# Patient Record
Sex: FEMALE | Race: OTHER | NOT HISPANIC OR LATINO | URBAN - METROPOLITAN AREA
[De-identification: names, ages, dates, MRNs, and addresses within clinical notes are randomized per-mention and may not be internally consistent; named-entity substitution may affect disease eponyms.]

---

## 2023-11-07 VITALS
RESPIRATION RATE: 17 BRPM | OXYGEN SATURATION: 98 % | SYSTOLIC BLOOD PRESSURE: 115 MMHG | TEMPERATURE: 98 F | WEIGHT: 130.07 LBS | DIASTOLIC BLOOD PRESSURE: 76 MMHG | HEART RATE: 90 BPM | HEIGHT: 67 IN

## 2023-11-07 LAB
ALBUMIN SERPL ELPH-MCNC: 4.8 G/DL — SIGNIFICANT CHANGE UP (ref 3.3–5)
ALBUMIN SERPL ELPH-MCNC: 4.8 G/DL — SIGNIFICANT CHANGE UP (ref 3.3–5)
ALP SERPL-CCNC: 68 U/L — SIGNIFICANT CHANGE UP (ref 40–120)
ALP SERPL-CCNC: 68 U/L — SIGNIFICANT CHANGE UP (ref 40–120)
ALT FLD-CCNC: 23 U/L — SIGNIFICANT CHANGE UP (ref 10–45)
ALT FLD-CCNC: 23 U/L — SIGNIFICANT CHANGE UP (ref 10–45)
ANION GAP SERPL CALC-SCNC: 11 MMOL/L — SIGNIFICANT CHANGE UP (ref 5–17)
ANION GAP SERPL CALC-SCNC: 11 MMOL/L — SIGNIFICANT CHANGE UP (ref 5–17)
AST SERPL-CCNC: 20 U/L — SIGNIFICANT CHANGE UP (ref 10–40)
AST SERPL-CCNC: 20 U/L — SIGNIFICANT CHANGE UP (ref 10–40)
BASOPHILS # BLD AUTO: 0.1 K/UL — SIGNIFICANT CHANGE UP (ref 0–0.2)
BASOPHILS # BLD AUTO: 0.1 K/UL — SIGNIFICANT CHANGE UP (ref 0–0.2)
BASOPHILS NFR BLD AUTO: 1.1 % — SIGNIFICANT CHANGE UP (ref 0–2)
BASOPHILS NFR BLD AUTO: 1.1 % — SIGNIFICANT CHANGE UP (ref 0–2)
BILIRUB SERPL-MCNC: 0.2 MG/DL — SIGNIFICANT CHANGE UP (ref 0.2–1.2)
BILIRUB SERPL-MCNC: 0.2 MG/DL — SIGNIFICANT CHANGE UP (ref 0.2–1.2)
BUN SERPL-MCNC: 11 MG/DL — SIGNIFICANT CHANGE UP (ref 7–23)
BUN SERPL-MCNC: 11 MG/DL — SIGNIFICANT CHANGE UP (ref 7–23)
CALCIUM SERPL-MCNC: 9.4 MG/DL — SIGNIFICANT CHANGE UP (ref 8.4–10.5)
CALCIUM SERPL-MCNC: 9.4 MG/DL — SIGNIFICANT CHANGE UP (ref 8.4–10.5)
CHLORIDE SERPL-SCNC: 104 MMOL/L — SIGNIFICANT CHANGE UP (ref 96–108)
CHLORIDE SERPL-SCNC: 104 MMOL/L — SIGNIFICANT CHANGE UP (ref 96–108)
CO2 SERPL-SCNC: 23 MMOL/L — SIGNIFICANT CHANGE UP (ref 22–31)
CO2 SERPL-SCNC: 23 MMOL/L — SIGNIFICANT CHANGE UP (ref 22–31)
CREAT SERPL-MCNC: 0.73 MG/DL — SIGNIFICANT CHANGE UP (ref 0.5–1.3)
CREAT SERPL-MCNC: 0.73 MG/DL — SIGNIFICANT CHANGE UP (ref 0.5–1.3)
EGFR: 117 ML/MIN/1.73M2 — SIGNIFICANT CHANGE UP
EGFR: 117 ML/MIN/1.73M2 — SIGNIFICANT CHANGE UP
EOSINOPHIL # BLD AUTO: 0.35 K/UL — SIGNIFICANT CHANGE UP (ref 0–0.5)
EOSINOPHIL # BLD AUTO: 0.35 K/UL — SIGNIFICANT CHANGE UP (ref 0–0.5)
EOSINOPHIL NFR BLD AUTO: 4 % — SIGNIFICANT CHANGE UP (ref 0–6)
EOSINOPHIL NFR BLD AUTO: 4 % — SIGNIFICANT CHANGE UP (ref 0–6)
GLUCOSE SERPL-MCNC: 126 MG/DL — HIGH (ref 70–99)
GLUCOSE SERPL-MCNC: 126 MG/DL — HIGH (ref 70–99)
HCT VFR BLD CALC: 42.2 % — SIGNIFICANT CHANGE UP (ref 34.5–45)
HCT VFR BLD CALC: 42.2 % — SIGNIFICANT CHANGE UP (ref 34.5–45)
HGB BLD-MCNC: 14.4 G/DL — SIGNIFICANT CHANGE UP (ref 11.5–15.5)
HGB BLD-MCNC: 14.4 G/DL — SIGNIFICANT CHANGE UP (ref 11.5–15.5)
IMM GRANULOCYTES NFR BLD AUTO: 0.2 % — SIGNIFICANT CHANGE UP (ref 0–0.9)
IMM GRANULOCYTES NFR BLD AUTO: 0.2 % — SIGNIFICANT CHANGE UP (ref 0–0.9)
LYMPHOCYTES # BLD AUTO: 2.27 K/UL — SIGNIFICANT CHANGE UP (ref 1–3.3)
LYMPHOCYTES # BLD AUTO: 2.27 K/UL — SIGNIFICANT CHANGE UP (ref 1–3.3)
LYMPHOCYTES # BLD AUTO: 25.7 % — SIGNIFICANT CHANGE UP (ref 13–44)
LYMPHOCYTES # BLD AUTO: 25.7 % — SIGNIFICANT CHANGE UP (ref 13–44)
MCHC RBC-ENTMCNC: 30.3 PG — SIGNIFICANT CHANGE UP (ref 27–34)
MCHC RBC-ENTMCNC: 30.3 PG — SIGNIFICANT CHANGE UP (ref 27–34)
MCHC RBC-ENTMCNC: 34.1 GM/DL — SIGNIFICANT CHANGE UP (ref 32–36)
MCHC RBC-ENTMCNC: 34.1 GM/DL — SIGNIFICANT CHANGE UP (ref 32–36)
MCV RBC AUTO: 88.7 FL — SIGNIFICANT CHANGE UP (ref 80–100)
MCV RBC AUTO: 88.7 FL — SIGNIFICANT CHANGE UP (ref 80–100)
MONOCYTES # BLD AUTO: 0.54 K/UL — SIGNIFICANT CHANGE UP (ref 0–0.9)
MONOCYTES # BLD AUTO: 0.54 K/UL — SIGNIFICANT CHANGE UP (ref 0–0.9)
MONOCYTES NFR BLD AUTO: 6.1 % — SIGNIFICANT CHANGE UP (ref 2–14)
MONOCYTES NFR BLD AUTO: 6.1 % — SIGNIFICANT CHANGE UP (ref 2–14)
NEUTROPHILS # BLD AUTO: 5.55 K/UL — SIGNIFICANT CHANGE UP (ref 1.8–7.4)
NEUTROPHILS # BLD AUTO: 5.55 K/UL — SIGNIFICANT CHANGE UP (ref 1.8–7.4)
NEUTROPHILS NFR BLD AUTO: 62.9 % — SIGNIFICANT CHANGE UP (ref 43–77)
NEUTROPHILS NFR BLD AUTO: 62.9 % — SIGNIFICANT CHANGE UP (ref 43–77)
NRBC # BLD: 0 /100 WBCS — SIGNIFICANT CHANGE UP (ref 0–0)
NRBC # BLD: 0 /100 WBCS — SIGNIFICANT CHANGE UP (ref 0–0)
PLATELET # BLD AUTO: 361 K/UL — SIGNIFICANT CHANGE UP (ref 150–400)
PLATELET # BLD AUTO: 361 K/UL — SIGNIFICANT CHANGE UP (ref 150–400)
POTASSIUM SERPL-MCNC: 4 MMOL/L — SIGNIFICANT CHANGE UP (ref 3.5–5.3)
POTASSIUM SERPL-MCNC: 4 MMOL/L — SIGNIFICANT CHANGE UP (ref 3.5–5.3)
POTASSIUM SERPL-SCNC: 4 MMOL/L — SIGNIFICANT CHANGE UP (ref 3.5–5.3)
POTASSIUM SERPL-SCNC: 4 MMOL/L — SIGNIFICANT CHANGE UP (ref 3.5–5.3)
PROT SERPL-MCNC: 7.6 G/DL — SIGNIFICANT CHANGE UP (ref 6–8.3)
PROT SERPL-MCNC: 7.6 G/DL — SIGNIFICANT CHANGE UP (ref 6–8.3)
RBC # BLD: 4.76 M/UL — SIGNIFICANT CHANGE UP (ref 3.8–5.2)
RBC # BLD: 4.76 M/UL — SIGNIFICANT CHANGE UP (ref 3.8–5.2)
RBC # FLD: 12.2 % — SIGNIFICANT CHANGE UP (ref 10.3–14.5)
RBC # FLD: 12.2 % — SIGNIFICANT CHANGE UP (ref 10.3–14.5)
SODIUM SERPL-SCNC: 138 MMOL/L — SIGNIFICANT CHANGE UP (ref 135–145)
SODIUM SERPL-SCNC: 138 MMOL/L — SIGNIFICANT CHANGE UP (ref 135–145)
WBC # BLD: 8.83 K/UL — SIGNIFICANT CHANGE UP (ref 3.8–10.5)
WBC # BLD: 8.83 K/UL — SIGNIFICANT CHANGE UP (ref 3.8–10.5)
WBC # FLD AUTO: 8.83 K/UL — SIGNIFICANT CHANGE UP (ref 3.8–10.5)
WBC # FLD AUTO: 8.83 K/UL — SIGNIFICANT CHANGE UP (ref 3.8–10.5)

## 2023-11-07 PROCEDURE — 93010 ELECTROCARDIOGRAM REPORT: CPT

## 2023-11-07 PROCEDURE — 99285 EMERGENCY DEPT VISIT HI MDM: CPT

## 2023-11-07 NOTE — ED ADULT TRIAGE NOTE - HISTORY OF COVID-19 VACCINATION
Care Due:                  Date            Visit Type   Department     Provider  --------------------------------------------------------------------------------                                [facility]                              ANNUAL       [facility]                              CHECKUP/PHY  MEDICINE/INTERN  Last Visit: 05-      S            AL JOHANA Swann                              [facility]                              ANNUAL       [facility]                              CHECKUP/PHY  MEDICINE/INTERN  Next Visit: 06-      S            CHRISTINE Swann                                                            Last  Test          Frequency    Reason                     Performed    Due Date  --------------------------------------------------------------------------------    CMP.........  12 months..  hydroCHLOROthiazide,       05- 05-                             simvastatin..............    Lipid Panel.  12 months..  simvastatin..............  05- 05-    [facility] Embedded Care Gaps. Reference number: [id number]. 5/19/2022   12:41:03 PM CDT   Yes

## 2023-11-07 NOTE — ED ADULT TRIAGE NOTE - CHIEF COMPLAINT QUOTE
Pt presents with c/o "suicidal ideation, hypomania, intrusive thoughts and hearing voices" x approx 3 months. Denies any plan, no previous attempt at suicide, has hx of "self harm". States "my thoughts are disorganized and I don't feel safe". Previous hospitalizations at outside facilities per pt. Hx of bipolar 1 and PTSD. Cooperative and compliant, maintains eye contact, affect WNL.

## 2023-11-07 NOTE — ED ADULT NURSE NOTE - OBJECTIVE STATEMENT
Received patient ambulatory accompanied by  with chief complaint of 'feeling of not being safe'. Said complaint started few days ago, however denies self-harm and hurting others. Denies signs and symptoms such as fever, vomiting, chest pain/discomfort, shortness of breath, diarrhea or body weakness.  On PE, AOX4, speaking full sentences without difficulty. Patient not in active cardiac or respiratory distress, no noted neurologic deficits.  No obvious trauma/injury/deformity noted. Patient oriented to ED area. All needs attended. POC reviewed. Patient placed on 1:1 observation. Purposeful proactive hourly rounding in progress.

## 2023-11-07 NOTE — ED ADULT NURSE NOTE - NSFALLUNIVINTERV_ED_ALL_ED
Bed/Stretcher in lowest position, wheels locked, appropriate side rails in place/Call bell, personal items and telephone in reach/Instruct patient to call for assistance before getting out of bed/chair/stretcher/Non-slip footwear applied when patient is off stretcher/Post to call system/Physically safe environment - no spills, clutter or unnecessary equipment/Purposeful proactive rounding/Room/bathroom lighting operational, light cord in reach

## 2023-11-07 NOTE — ED ADULT NURSE NOTE - IS THE PATIENT ABLE TO BE SCREENED?
DATE:  07/09/2020



FOLLOWUP PROGRESS NOTE



Case was discussed with staff of the patient, reviewed records.  The patient has

been taking medications and sometimes she refuses them.  She is internally

preoccupied.  Continues to be psychotic, continues to be unable to make safe

plan for self-care.  Continues to have poor insight.  I will be increasing her

dose of Haldol to 4 mg twice a day to improve her psychotic behavior as she

continues to be paranoid and delusional.  No side effects with the medication,

no sedation, no nausea and no extrapyramidal symptoms.  We will continue to work

with the patient in group therapy, milieu therapy, adjust the medication as

needed.





DD: 07/09/2020 10:43

DT: 07/10/2020 04:46

JOB# [id number]  [id number] Yes

## 2023-11-08 ENCOUNTER — INPATIENT (INPATIENT)
Facility: HOSPITAL | Age: 23
LOS: 8 days | Discharge: NURSING, REHAB OR LONG-TERM CARE | DRG: 881 | End: 2023-11-17
Attending: PSYCHIATRY & NEUROLOGY | Admitting: PSYCHIATRY & NEUROLOGY
Payer: COMMERCIAL

## 2023-11-08 DIAGNOSIS — F39 UNSPECIFIED MOOD [AFFECTIVE] DISORDER: ICD-10-CM

## 2023-11-08 LAB
AMPHET UR-MCNC: NEGATIVE — SIGNIFICANT CHANGE UP
AMPHET UR-MCNC: NEGATIVE — SIGNIFICANT CHANGE UP
APAP SERPL-MCNC: <5 UG/ML — LOW (ref 10–30)
APAP SERPL-MCNC: <5 UG/ML — LOW (ref 10–30)
APPEARANCE UR: CLEAR — SIGNIFICANT CHANGE UP
APPEARANCE UR: CLEAR — SIGNIFICANT CHANGE UP
BARBITURATES UR SCN-MCNC: NEGATIVE — SIGNIFICANT CHANGE UP
BARBITURATES UR SCN-MCNC: NEGATIVE — SIGNIFICANT CHANGE UP
BENZODIAZ UR-MCNC: NEGATIVE — SIGNIFICANT CHANGE UP
BENZODIAZ UR-MCNC: NEGATIVE — SIGNIFICANT CHANGE UP
BILIRUB UR-MCNC: NEGATIVE — SIGNIFICANT CHANGE UP
BILIRUB UR-MCNC: NEGATIVE — SIGNIFICANT CHANGE UP
COCAINE METAB.OTHER UR-MCNC: NEGATIVE — SIGNIFICANT CHANGE UP
COCAINE METAB.OTHER UR-MCNC: NEGATIVE — SIGNIFICANT CHANGE UP
COLOR SPEC: YELLOW — SIGNIFICANT CHANGE UP
COLOR SPEC: YELLOW — SIGNIFICANT CHANGE UP
COVID-19 SPIKE DOMAIN AB INTERP: POSITIVE
COVID-19 SPIKE DOMAIN AB INTERP: POSITIVE
COVID-19 SPIKE DOMAIN ANTIBODY RESULT: >250 U/ML — HIGH
COVID-19 SPIKE DOMAIN ANTIBODY RESULT: >250 U/ML — HIGH
DIFF PNL FLD: NEGATIVE — SIGNIFICANT CHANGE UP
DIFF PNL FLD: NEGATIVE — SIGNIFICANT CHANGE UP
ETHANOL SERPL-MCNC: <10 MG/DL — SIGNIFICANT CHANGE UP (ref 0–10)
ETHANOL SERPL-MCNC: <10 MG/DL — SIGNIFICANT CHANGE UP (ref 0–10)
GLUCOSE UR QL: NEGATIVE MG/DL — SIGNIFICANT CHANGE UP
GLUCOSE UR QL: NEGATIVE MG/DL — SIGNIFICANT CHANGE UP
HCG SERPL-ACNC: <0 MIU/ML — SIGNIFICANT CHANGE UP
HCG SERPL-ACNC: <0 MIU/ML — SIGNIFICANT CHANGE UP
KETONES UR-MCNC: ABNORMAL MG/DL
KETONES UR-MCNC: ABNORMAL MG/DL
LEUKOCYTE ESTERASE UR-ACNC: NEGATIVE — SIGNIFICANT CHANGE UP
LEUKOCYTE ESTERASE UR-ACNC: NEGATIVE — SIGNIFICANT CHANGE UP
METHADONE UR-MCNC: NEGATIVE — SIGNIFICANT CHANGE UP
METHADONE UR-MCNC: NEGATIVE — SIGNIFICANT CHANGE UP
NITRITE UR-MCNC: NEGATIVE — SIGNIFICANT CHANGE UP
NITRITE UR-MCNC: NEGATIVE — SIGNIFICANT CHANGE UP
OPIATES UR-MCNC: NEGATIVE — SIGNIFICANT CHANGE UP
OPIATES UR-MCNC: NEGATIVE — SIGNIFICANT CHANGE UP
PCP SPEC-MCNC: SIGNIFICANT CHANGE UP
PCP SPEC-MCNC: SIGNIFICANT CHANGE UP
PCP UR-MCNC: NEGATIVE — SIGNIFICANT CHANGE UP
PCP UR-MCNC: NEGATIVE — SIGNIFICANT CHANGE UP
PH UR: 6 — SIGNIFICANT CHANGE UP (ref 5–8)
PH UR: 6 — SIGNIFICANT CHANGE UP (ref 5–8)
PROT UR-MCNC: NEGATIVE MG/DL — SIGNIFICANT CHANGE UP
PROT UR-MCNC: NEGATIVE MG/DL — SIGNIFICANT CHANGE UP
SALICYLATES SERPL-MCNC: <0.3 MG/DL — LOW (ref 2.8–20)
SALICYLATES SERPL-MCNC: <0.3 MG/DL — LOW (ref 2.8–20)
SARS-COV-2 IGG+IGM SERPL QL IA: >250 U/ML — HIGH
SARS-COV-2 IGG+IGM SERPL QL IA: >250 U/ML — HIGH
SARS-COV-2 IGG+IGM SERPL QL IA: POSITIVE
SARS-COV-2 IGG+IGM SERPL QL IA: POSITIVE
SARS-COV-2 RNA SPEC QL NAA+PROBE: NEGATIVE — SIGNIFICANT CHANGE UP
SARS-COV-2 RNA SPEC QL NAA+PROBE: NEGATIVE — SIGNIFICANT CHANGE UP
SP GR SPEC: 1.01 — SIGNIFICANT CHANGE UP (ref 1–1.03)
SP GR SPEC: 1.01 — SIGNIFICANT CHANGE UP (ref 1–1.03)
THC UR QL: NEGATIVE — SIGNIFICANT CHANGE UP
THC UR QL: NEGATIVE — SIGNIFICANT CHANGE UP
TSH SERPL-MCNC: 0.85 UIU/ML — SIGNIFICANT CHANGE UP (ref 0.27–4.2)
TSH SERPL-MCNC: 0.85 UIU/ML — SIGNIFICANT CHANGE UP (ref 0.27–4.2)
UROBILINOGEN FLD QL: 0.2 MG/DL — SIGNIFICANT CHANGE UP (ref 0.2–1)
UROBILINOGEN FLD QL: 0.2 MG/DL — SIGNIFICANT CHANGE UP (ref 0.2–1)

## 2023-11-08 PROCEDURE — 90792 PSYCH DIAG EVAL W/MED SRVCS: CPT | Mod: 95

## 2023-11-08 RX ORDER — ONDANSETRON 8 MG/1
4 TABLET, FILM COATED ORAL ONCE
Refills: 0 | Status: COMPLETED | OUTPATIENT
Start: 2023-11-08 | End: 2023-11-08

## 2023-11-08 RX ORDER — ONDANSETRON 8 MG/1
8 TABLET, FILM COATED ORAL EVERY 8 HOURS
Refills: 0 | Status: DISCONTINUED | OUTPATIENT
Start: 2023-11-08 | End: 2023-11-17

## 2023-11-08 RX ORDER — LURASIDONE HYDROCHLORIDE 40 MG/1
60 TABLET ORAL AT BEDTIME
Refills: 0 | Status: DISCONTINUED | OUTPATIENT
Start: 2023-11-08 | End: 2023-11-17

## 2023-11-08 RX ORDER — ONDANSETRON 8 MG/1
4 TABLET, FILM COATED ORAL EVERY 8 HOURS
Refills: 0 | Status: DISCONTINUED | OUTPATIENT
Start: 2023-11-08 | End: 2023-11-17

## 2023-11-08 RX ORDER — QUETIAPINE FUMARATE 200 MG/1
50 TABLET, FILM COATED ORAL EVERY 6 HOURS
Refills: 0 | Status: DISCONTINUED | OUTPATIENT
Start: 2023-11-08 | End: 2023-11-17

## 2023-11-08 RX ORDER — INFLUENZA VIRUS VACCINE 15; 15; 15; 15 UG/.5ML; UG/.5ML; UG/.5ML; UG/.5ML
0.5 SUSPENSION INTRAMUSCULAR ONCE
Refills: 0 | Status: COMPLETED | OUTPATIENT
Start: 2023-11-08 | End: 2023-11-13

## 2023-11-08 RX ORDER — ACETAMINOPHEN 500 MG
975 TABLET ORAL ONCE
Refills: 0 | Status: COMPLETED | OUTPATIENT
Start: 2023-11-08 | End: 2023-11-08

## 2023-11-08 RX ORDER — ACETAMINOPHEN 500 MG
650 TABLET ORAL EVERY 6 HOURS
Refills: 0 | Status: DISCONTINUED | OUTPATIENT
Start: 2023-11-08 | End: 2023-11-17

## 2023-11-08 RX ORDER — HYDROXYZINE HCL 10 MG
25 TABLET ORAL EVERY 6 HOURS
Refills: 0 | Status: DISCONTINUED | OUTPATIENT
Start: 2023-11-08 | End: 2023-11-17

## 2023-11-08 RX ORDER — ONDANSETRON 8 MG/1
2 TABLET, FILM COATED ORAL ONCE
Refills: 0 | Status: DISCONTINUED | OUTPATIENT
Start: 2023-11-08 | End: 2023-11-08

## 2023-11-08 RX ADMIN — Medication 650 MILLIGRAM(S): at 14:05

## 2023-11-08 RX ADMIN — LURASIDONE HYDROCHLORIDE 60 MILLIGRAM(S): 40 TABLET ORAL at 23:23

## 2023-11-08 RX ADMIN — Medication 650 MILLIGRAM(S): at 13:05

## 2023-11-08 RX ADMIN — ONDANSETRON 4 MILLIGRAM(S): 8 TABLET, FILM COATED ORAL at 16:40

## 2023-11-08 RX ADMIN — Medication 650 MILLIGRAM(S): at 22:56

## 2023-11-08 RX ADMIN — Medication 975 MILLIGRAM(S): at 03:24

## 2023-11-08 RX ADMIN — Medication 650 MILLIGRAM(S): at 21:31

## 2023-11-08 RX ADMIN — LURASIDONE HYDROCHLORIDE 60 MILLIGRAM(S): 40 TABLET ORAL at 05:14

## 2023-11-08 RX ADMIN — ONDANSETRON 4 MILLIGRAM(S): 8 TABLET, FILM COATED ORAL at 22:56

## 2023-11-08 NOTE — ED BEHAVIORAL HEALTH ASSESSMENT NOTE - RISK ASSESSMENT
RF: previous admission, self harm, previous substance use, SI     PF: connected to outpatient care, denies substances, sobriety     RM: admit

## 2023-11-08 NOTE — ED PROVIDER NOTE - OBJECTIVE STATEMENT
23 yr old female, history of bipolar 1, 23 yr old female, history of bipolar, PTSD, no significant pmh, presents to the Emergency Department w abi.  psych hospitalization 6yrs ago in [place]. compliant w latuda. follows w outpt psych team regularly. denies drug / etoh use. sober x 4 yrs from previous etoh use.   pt here w worsening suicidal ideations, intrusive thoughts, limited self control x few months. worse in the last week. here concerned that she is nearing a manic episode and would like to get help. has engaged in self harm - only time three weeks ago (burning self) but having increasing urges to do so that she doesn't feel as though she can control. thoughts of SI w thought to jump in front of subway train. feels her thoughts are disorganized. hears voices when trying to go to bed at night. attributes this to worsening stress w grad school and issues w her partner.

## 2023-11-08 NOTE — ED BEHAVIORAL HEALTH ASSESSMENT NOTE - OTHER PAST PSYCHIATRIC HISTORY (INCLUDE DETAILS REGARDING ONSET, COURSE OF ILLNESS, INPATIENT/OUTPATIENT TREATMENT)
Previous Dx: Bipolar PTSD     Previous Med Trials: reports many previous trials unk names     Previous IP treatment: 4x, last 6 years ago in [place]     Previous SA: denies     Self harming: burning with cigarettes (3 weeks ago last)     Current MH treatment: psychiatrist but unk name ([facility]); Herminia is therpaist seen weekly     Violence/Legal: denies O-Z Flap Text: The defect edges were debeveled with a #15 scalpel blade.  Given the location of the defect, shape of the defect and the proximity to free margins an O-Z flap was deemed most appropriate.  Using a sterile surgical marker, an appropriate transposition flap was drawn incorporating the defect and placing the expected incisions within the relaxed skin tension lines where possible. The area thus outlined was incised deep to adipose tissue with a #15 scalpel blade.  The skin margins were undermined to an appropriate distance in all directions utilizing iris scissors.

## 2023-11-08 NOTE — BH SOCIAL WORK INITIAL PSYCHOSOCIAL EVALUATION - DETAILS
Per Chart: Reports mom with depression; uncle alcoholism, cousin with bipolar disorder 2, sister has patter of suicide attempts, eating disorders; sister with depression; brother with substance use hx.

## 2023-11-08 NOTE — ED BEHAVIORAL HEALTH ASSESSMENT NOTE - HPI (INCLUDE ILLNESS QUALITY, SEVERITY, DURATION, TIMING, CONTEXT, MODIFYING FACTORS, ASSOCIATED SIGNS AND SYMPTOMS)
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 To note patient has prev hospitalization (last in [place] about 6 years ago), no previous SA, previous self harm (last reported 3 weeks ago), no legal/violence hx,  substance use hx (reports alcohol sobriety for 4 years).      On interview, she reports having worsening SI and intrusive thoughts for the past 3 months and worsening in the past week. Reports that she is having worsening SI and has thought about jumping in front of subway train. She also reports urges to self harm via burning and urge to use substances (reports sobriety for 4 years). Reports additionally feeling thoughts are disorganized and VH intrusive thoughts of self harm and SI. Reports chronic AH when going to bed and denies any changes. Reports stressor of grad school this fall and difficulty with partner. 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 Reports compliance with Latuda 60mg qHS for past years (reports increased to current dose this year). Notes seeing psychiatrist and therapist. Reports hx of alonso last about 6 years ago when hospitalized with poor sleep and psychosis. Reports currently having difficulty with sleeping and only getting about 5 hours of sleep (denies full alonso sx). Wishes for admission. Denies HI.

## 2023-11-08 NOTE — ED BEHAVIORAL HEALTH ASSESSMENT NOTE - DETAILS
reports mom with depression; uncle alcoholism, cousin with bipolar disorder 2, sister has patter of suicide attempts, eating disorders; sister with depression; brother with substance use hx self see HPI via phone reports PTSD hx
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
## 2023-11-08 NOTE — BH INPATIENT PSYCHIATRY ASSESSMENT NOTE - NSBHCHARTREVIEWVS_PSY_A_CORE FT
Vital Signs Last 24 Hrs  T(C): 36.6 (11-08-23 @ 09:46), Max: 36.7 (11-08-23 @ 03:20)  T(F): 97.8 (11-08-23 @ 09:46), Max: 98 (11-08-23 @ 03:20)  HR: 68 (11-08-23 @ 09:46) (68 - 90)  BP: 107/69 (11-08-23 @ 09:46) (107/69 - 134/83)  BP(mean): --  RR: 16 (11-08-23 @ 09:46) (16 - 18)  SpO2: 99% (11-08-23 @ 09:46) (98% - 100%)     Vital Signs Last 24 Hrs  T(C): 37.1 (11-08-23 @ 16:42), Max: 37.1 (11-08-23 @ 16:42)  T(F): 98.8 (11-08-23 @ 16:42), Max: 98.8 (11-08-23 @ 16:42)  HR: 69 (11-08-23 @ 16:42) (68 - 90)  BP: 126/86 (11-08-23 @ 16:42) (107/69 - 134/83)  BP(mean): --  RR: 18 (11-08-23 @ 16:42) (16 - 18)  SpO2: 98% (11-08-23 @ 16:42) (98% - 100%)     Vital Signs Last 24 Hrs  T(C): 36.4 (11-16-23 @ 17:06), Max: 36.7 (11-16-23 @ 09:05)  T(F): 97.6 (11-16-23 @ 17:06), Max: 98.1 (11-16-23 @ 09:05)  HR: 73 (11-16-23 @ 17:06) (67 - 73)  BP: 119/80 (11-16-23 @ 17:06) (118/80 - 119/80)  BP(mean): --  RR: 18 (11-16-23 @ 17:06) (18 - 18)  SpO2: 97% (11-16-23 @ 17:06) (97% - 97%)

## 2023-11-08 NOTE — BH SOCIAL WORK INITIAL PSYCHOSOCIAL EVALUATION - NSBHEMPPOSITIONFT_PSY_ALL_CORE
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

## 2023-11-08 NOTE — BH INPATIENT PSYCHIATRY ASSESSMENT NOTE - OTHER PAST PSYCHIATRIC HISTORY (INCLUDE DETAILS REGARDING ONSET, COURSE OF ILLNESS, INPATIENT/OUTPATIENT TREATMENT)
Previous Dx: Bipolar PTSD     Previous Med Trials: reports many previous trials unk names     Previous IP treatment: 4x, last 6 years ago in [place]     Previous SA: denies     Self harming: burning with cigarettes (3 weeks ago last)     Current MH treatment: psychiatrist but unk name ([facility]); Herminia is therpaist seen weekly     Violence/Legal: denies

## 2023-11-08 NOTE — BH INPATIENT PSYCHIATRY ASSESSMENT NOTE - CURRENT MEDICATION
MEDICATIONS  (STANDING):  influenza   Vaccine 0.5 milliLiter(s) IntraMuscular once  lurasidone 60 milliGRAM(s) Oral at bedtime    MEDICATIONS  (PRN):  acetaminophen     Tablet .. 650 milliGRAM(s) Oral every 6 hours PRN Temp greater or equal to 38C (100.4F), Moderate Pain (4 - 6)  hydrOXYzine hydrochloride 25 milliGRAM(s) Oral every 6 hours PRN Anxiety  ondansetron    Tablet 4 milliGRAM(s) Oral every 8 hours PRN Nausea and/or Vomiting  QUEtiapine 50 milliGRAM(s) Oral every 6 hours PRN agitation   MEDICATIONS  (STANDING):  influenza   Vaccine 0.5 milliLiter(s) IntraMuscular once  lurasidone 60 milliGRAM(s) Oral at bedtime  ondansetron    Tablet 4 milliGRAM(s) Oral once    MEDICATIONS  (PRN):  acetaminophen     Tablet .. 650 milliGRAM(s) Oral every 6 hours PRN Temp greater or equal to 38C (100.4F), Moderate Pain (4 - 6)  hydrOXYzine hydrochloride 25 milliGRAM(s) Oral every 6 hours PRN Anxiety  ondansetron    Tablet 4 milliGRAM(s) Oral every 8 hours PRN Nausea and/or Vomiting  QUEtiapine 50 milliGRAM(s) Oral every 6 hours PRN agitation   MEDICATIONS  (STANDING):  fluvoxaMINE 50 milliGRAM(s) Oral at bedtime  lurasidone 60 milliGRAM(s) Oral at bedtime    MEDICATIONS  (PRN):  acetaminophen     Tablet .. 650 milliGRAM(s) Oral every 6 hours PRN Temp greater or equal to 38C (100.4F), Moderate Pain (4 - 6)  hydrOXYzine hydrochloride 25 milliGRAM(s) Oral every 6 hours PRN Anxiety  ondansetron    Tablet 4 milliGRAM(s) Oral every 8 hours PRN Nausea and/or Vomiting  ondansetron Injectable 8 milliGRAM(s) IntraMuscular every 8 hours PRN nausea/vomiting  QUEtiapine 50 milliGRAM(s) Oral every 6 hours PRN agitation

## 2023-11-08 NOTE — BH INPATIENT PSYCHIATRY ASSESSMENT NOTE - NSBHMETABOLIC_PSY_ALL_CORE_FT
BMI: BMI (kg/m2): 20.4 (11-08-23 @ 05:45)  HbA1c:   Glucose:   BP: 107/69 (11-08-23 @ 09:46) (107/69 - 134/83)  Lipid Panel:  BMI: BMI (kg/m2): 20.4 (11-08-23 @ 05:45)  HbA1c:   Glucose:   BP: 126/86 (11-08-23 @ 16:42) (107/69 - 134/83)  Lipid Panel:  BMI: BMI (kg/m2): 20.4 (11-08-23 @ 05:45)  HbA1c: A1C with Estimated Average Glucose Result: 5.1 % (11-09-23 @ 05:30)    Glucose:   BP: 119/80 (11-16-23 @ 17:06) (111/71 - 129/88)  Lipid Panel: Date/Time: 11-09-23 @ 05:30  Cholesterol, Serum: 156  Direct LDL: --  HDL Cholesterol, Serum: 45  Total Cholesterol/HDL Ration Measurement: --  Triglycerides, Serum: 54

## 2023-11-08 NOTE — ED BEHAVIORAL HEALTH ASSESSMENT NOTE - SUMMARY
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 To note patient has prev hospitalization (last in [place] about 6 years ago), no previous SA, previous self harm (last reported 3 weeks ago), no legal/violence hx,  substance use hx (reports alcohol sobriety for 4 years).      Patient presenting to the ED with worsening SI, intrusive thoughts of self harm and death, intrusive VH of death/suicide with reported hx of bipolar 1 disorder. Patient on exam is flat and reports worsening depression and inability to be safe at home. Patient at this time meets inpatient criteria for admission given her mental illness and to maintain patient safety.      Plan:    -Admit [facility] to [facility], awaiting legals/EKG   -Continue Home Medication which includes: Latuda 60mg qHS   -PRNs: Seroquel 50mg q6hr prn severe agitation; Hydroxyzine 25mg q6hr prn anxiety; monitor QTc, monitor for sedation, attempt verbal redirection   -Labs:   ---CBC: unremarkable    ---CMP: unremarkable   ---hcg: neg    ---EKG: completed   ---UDS: neg   ---BAL: neg   -COVID PCR-neg

## 2023-11-08 NOTE — BH PATIENT PROFILE - STATED REASON FOR ADMISSION
"I have Bipolar I and PTSD, I was hospitalized before for alonso and suicidal thoughts, about three months ago I started having suicidal thoughts and self harmed three weeks ago."

## 2023-11-08 NOTE — BH INPATIENT PSYCHIATRY ASSESSMENT NOTE - RISK ASSESSMENT
Pt hx of chronic suicidality and increased acute suicide risk. Risk factors include poor response to medications, psychosocial stressors in personal relationships/school, hx of SI/NSSIB, active depressive symptoms. Protective factors include help seeking behavior, connecting to outpatient treatment, medication adherence, no hx of SA, stable housing

## 2023-11-08 NOTE — ED BEHAVIORAL HEALTH NOTE - BEHAVIORAL HEALTH NOTE
==================  PRE-HOSPITAL COURSE  ===================  SOURCE:  RN and secondhand ED documentation  DETAILS: BIB self for NSSIB burning  =========  ED COURSE  =========  SOURCE:  RN and secondhand ED documentation.  ARRIVAL:  Per chart and RN, patient arrived via walk in. Per RN, patient was calm upon arrival, and cooperative with triage process.  BELONGINGS:  Per RN, patient arrived with belongings. All belongings were provided to hospital security, and patient currently in a gown with a 1:1 staff member.  BEHAVIOR: RN described patient to be calm and cooperative, presenting with linear thought process, AAOx3, presenting with normal mood and congruent affect, remains in behavioral and impulse control, is not violent/aggressive. RN stated that the patient is not endorsing SI/HI/A/VH. RN stated that there are lesions on L arm_. RN stated that the patient appears to be well-groomed, maintains good hygiene.  TREATMENT:  Per chart and RN, patient did not receive PRN medications.   VISITORS:  Per RN, no visitors at bedside.          COVID Exposure Screen- collateral (i.e. third-party, chart review, belongings, etc; include EMS and ED staff)   ---------------------------------------------------  1. Has the patient had a COVID-19 test in the last 90 days? Unknown.   2. Has the patient tested positive for COVID-19 antibodies? Unknown.   3. Has the patient received 2 doses of the COVID-19 vaccine? Unknown  4. In the past 10 days, has the patient been around anyone with a positive COVID-19 test?* Unknown.   5. Has the patient been out of [place] within the past 10 days? Unknown

## 2023-11-08 NOTE — BH SOCIAL WORK INITIAL PSYCHOSOCIAL EVALUATION - NSPTSTATEDGOAL_PSY_ALL_CORE
"I am okay with staying in the hospital for 1-2 weeks. I'd like an appointment with my outpatient therapist, and a new psychiatrist."

## 2023-11-08 NOTE — BH SOCIAL WORK INITIAL PSYCHOSOCIAL EVALUATION - NSBHHOUSECOMMENTFT_PSY_ALL_CORE
Pt is domiciled with her partner & dog. Per chart, pt's address is: [address] [place] [id number].  Pt is domiciled with her partner & dogs. Per chart, pt's address is: [address] [place] [id number].

## 2023-11-08 NOTE — BH SOCIAL WORK INITIAL PSYCHOSOCIAL EVALUATION - NSHIGHRISKBEHFT_PSY_ALL_CORE
Per Chart: Pt presenting with SI upon hospital admission, has previous self harm (last reported 3 weeks ago), & has substance use hx (reports alcohol sobriety for 4 years).

## 2023-11-08 NOTE — BH INPATIENT PSYCHIATRY ASSESSMENT NOTE - DESCRIPTION
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
## 2023-11-08 NOTE — BH INPATIENT PSYCHIATRY ASSESSMENT NOTE - NSBHPHYSICALEXAM_PSY_ALL_CORE
Pt declining physical exam  which is appropriately respected in the setting of normal vital signs and absence of new physical complaints. Reviewed PE from ED course. Will continue to monitor for emergent need for physical examination.

## 2023-11-08 NOTE — ED PROVIDER NOTE - PROGRESS NOTE DETAILS
screening labs and ecg ok.   seen by telepsych. ok for voluntary admission   papers filled out. admission orders placed.

## 2023-11-08 NOTE — ED BEHAVIORAL HEALTH ASSESSMENT NOTE - TIME CONSULT PERFORMED
[facility]  Pulmonary, Critical Care, and Sleep Medicine    Name: Nisha Braden MRN: [id number]   : 1943 Hospital: [facility]   Date: 10/21/2020        Critical Care Initial Patient Consult      Pt was an ER \"Icu Hold\"  Has frequent admissions for DKA due poor social support. Discussed with DM treatment team.     At this point suggested to  Start NPH 10 units sq bid. Will give a now dose and then turn off the insulin drip in 2 hrs. Pt appears stable for transfer to Step Down Unit. Will change orders. Call if any issues. IMPRESSION:   · DKA  · Encephalopathy on presentation, Now seems better. · Poorly Controlled DM, A1C of 13.   · Hypernatremia  · Medical Noncompliance  · Dehydration. · Leukocytosis  · Anemia: Hgb of 11.3. · Acute Renal failure. Cr has improved. · Lactic Acidosis:  · Htn, Low Thyroid, Dementia, Prior CVA. RECOMMENDATIONS:   · Anion Gap Closed. · Started on NPH insulin 10units bid  · Will stop insulin drip after the NPH was given  · Will follow Serial Labs  · Ask DM team to see her   · Serial labs  · ON BP meds  · ON Zosyn? Not sure why. · Will see again as needed. · Down graded from ICU to Step Down bed. Subjective/History: This patient has been seen and evaluated at the request of Dr. Yobani Wright for above. Patient is a 64 y.o. female who was seen in ER this pm. Pt is without acute complaints. Tolerated po intake. Was brought to ER for AMS, increased blood sugars. Has been admitted multiple times for above. Severe acidosis       Further HPI and ROS is not obtainable due to AMS. Confusion, Dementia.      Past Medical History:   Diagnosis Date    Heart failure ([facility] [address]     unknown to family    Hypertension     Stroke [facility])       Past Surgical History:   Procedure Laterality Date    HX GYN      HX HEENT      thyroidectomy    HX HYSTERECTOMY      REMOVAL GALLBLADDER      THYROIDECTOMY        Prior to Admission medications    Medication Sig Start Date End Date Taking? Authorizing Provider   amLODIPine (NORVASC) 10 mg tablet Take 1 Tab by mouth daily. 10/5/20  Yes Cahni Ivan MD   insulin degludec [place] FlexTouch U-100) 100 unit/mL (3 mL) inpn 14 Units by SubCUTAneous route daily. 9/21/20  Yes Chani Ivan MD   insulin lispro (HUMALOG) 100 unit/mL kwikpen 3 units before breakfast and lunch,and 2 units before dinner 9/21/20  Yes Chani Ivan MD   losartan-hydroCHLOROthiazide [facility]) 100-25 mg per tablet Take 1 Tab by mouth daily. Yes Provider, Historical   metoprolol succinate (TOPROL-XL) 25 mg XL tablet TAKE 1 TABLET BY MOUTH EVERY DAY 5/11/20  Yes Chani Ivan MD   pravastatin (PRAVACHOL) 80 mg tablet TAKE 1 TABLET BY MOUTH at bedtime 2/27/20  Yes Chani Ivan MD   levothyroxine (SYNTHROID) 175 mcg tablet TAKE 1 TABLET BY MOUTH EVERY DAY 2/27/20  Yes Chani Ivan MD   clopidogreL (PLAVIX) 75 mg tab TAKE 1 TABLET BY MOUTH EVERY DAY 2/27/20  Yes Chani Ivan MD   brimonidine (ALPHAGAN) 0.15 % ophthalmic solution Administer 1 Drop to both eyes two (2) times a day. 1/30/15  Yes Provider, Historical   zinc oxide 20 % ointment Apply 1 Applicator to affected area two (2) times a day.  thin layer gluteal cleft    Provider, Historical     Current Facility-Administered Medications   Medication Dose Route Frequency    dextrose 5% - 0.45% NaCl with KCl 40 mEq/L infusion   IntraVENous CONTINUOUS    alcohol 62% (NOZIN) nasal  1 Ampule  1 Ampule Topical Q12H    insulin glargine (LANTUS) injection 18 Units  18 Units SubCUTAneous NOW    insulin regular (NOVOLIN R, HUMULIN R) 100 Units in 0.9% sodium chloride 100 mL infusion  0-50 Units/hr IntraVENous TITRATE    insulin lispro (HUMALOG) injection 3 Units  3 Units SubCUTAneous TIDAC    nystatin (MYCOSTATIN) 100,000 unit/gram powder   Topical BID    albuterol CONCENTRATE 2.5mg/0.5 mL neb soln  10 mg/hr Nebulization CONTINUOUS    amLODIPine (NORVASC) tablet 10 mg  10 mg Oral DAILY    brimonidine (ALPHAGAN) 0.2 % ophthalmic solution 1 Drop  1 Drop Both Eyes BID    clopidogreL (PLAVIX) tablet 75 mg  75 mg Oral DAILY    levothyroxine (SYNTHROID) tablet 175 mcg  175 mcg Oral ACB    metoprolol succinate (TOPROL-XL) XL tablet 25 mg  25 mg Oral DAILY    sodium chloride (NS) flush 5-40 mL  5-40 mL IntraVENous Q8H    heparin (porcine) injection 5,000 Units  5,000 Units SubCUTAneous Q8H    fluconazole (DIFLUCAN) 200mg/100 mL IVPB (premix)  200 mg IntraVENous DAILY    piperacillin-tazobactam (ZOSYN) 3.375 g in 0.9% sodium chloride (MBP/ADV) 100 mL  3.375 g IntraVENous Q8H     No Known Allergies   Social History     Tobacco Use    Smoking status: Never Smoker    Smokeless tobacco: Never Used   Substance Use Topics    Alcohol use: No     Comment: been years      Family History   Problem Relation Age of Onset    Diabetes Father     No Known Problems Mother         Review of Systems:  Review of systems not obtained due to patient factors. Objective:   Vital Signs:    Visit Vitals  BP (!) 118/50 (BP 1 Location: Right arm, BP Patient Position: At rest)   Pulse 75   Temp 97.7 °F (36.5 °C)   Resp 16   Wt 66.9 kg (147 lb 7.8 oz)   SpO2 100%   BMI 25.32 kg/m²       O2 Device: Room air       Temp (24hrs), Av.4 °F (36.9 °C), Min:97.7 °F (36.5 °C), Max:98.8 °F (37.1 °C)       Intake/Output:   Last shift:      10/21 0701 - 10/21 1900  In: 1692.5 [I.V.:1692.5]  Out: 650 [Urine:650]  Last 3 shifts: No intake/output data recorded.     Intake/Output Summary (Last 24 hours) at 10/21/2020 1501  Last data filed at 10/21/2020 1200  Gross per 24 hour   Intake 1692.5 ml   Output 650 ml   Net 1042.5 ml     Hemodynamics:   PAP:   CO:     Wedge:   CI:     CVP:    SVR:       PVR:       Ventilator Settings:  Mode Rate Tidal Volume Pressure FiO2 PEEP                    Peak airway pressure:      Minute ventilation:        Physical Exam:    General:  Alert, cooperative, no distress, appears stated age. Head:  Normocephalic, without obvious abnormality, atraumatic. Eyes:  Conjunctivae/corneas clear. PERRL, EOMs intact. Nose: Nares normal. Septum midline. Mucosa normal. No drainage or sinus tenderness. Throat: Lips, mucosa, and tongue normal. Teeth and gums normal.   Neck: Supple, symmetrical, trachea midline, no adenopathy, thyroid: no enlargment/tenderness/nodules, no carotid bruit and no JVD. Back:   Symmetric, no curvature. ROM normal.   Lungs:   Clear to auscultation bilaterally. Chest wall:  No tenderness or deformity. Heart:  Regular rate and rhythm, S1, S2 normal, no murmur, click, rub or gallop. Abdomen:   Soft, non-tender. Bowel sounds normal. No masses,  No organomegaly. Extremities: Extremities normal, atraumatic, no cyanosis or edema. Pulses: 2+ and symmetric all extremities.    Skin: Skin color, texture, turgor normal. No rashes or lesions   Lymph nodes: Cervical, supraclavicular, and axillary nodes normal.   Neurologic: Grossly nonfocal       Data:     Recent Results (from the past 24 hour(s))   GLUCOSE, POC    Collection Time: 10/20/20  5:29 PM   Result Value Ref Range    Glucose (POC) >600 (HH) 65 - 100 mg/dL    Performed by [employer] T (PCT)    GLUCOSE, POC    Collection Time: 10/20/20  5:31 PM   Result Value Ref Range    Glucose (POC) >600 (HH) 65 - 100 mg/dL    Performed by [employer] T (PCT)    EKG, 12 LEAD, INITIAL    Collection Time: 10/20/20  5:34 PM   Result Value Ref Range    Ventricular Rate 104 BPM    Atrial Rate 104 BPM    P-R Interval 150 ms    QRS Duration 88 ms    Q-T Interval 358 ms    QTC Calculation (Bezet) 470 ms    Calculated P Axis 38 degrees    Calculated R Axis -43 degrees    Calculated T Axis -6 degrees    Diagnosis       Sinus tachycardia  Possible Left atrial enlargement  Left axis deviation  Possible Anterior infarct (cited on or before 20-OCT-2020)  When compared with ECG of 16-SEP-2020 04:24,  Questionable change in initial forces of Septal leads  Inverted T waves have replaced nonspecific T wave abnormality in Inferior   leads     CBC WITH AUTOMATED DIFF    Collection Time: 10/20/20  5:48 PM   Result Value Ref Range    WBC 30.7 (H) 3.6 - 11.0 K/uL    RBC 3.53 (L) 3.80 - 5.20 M/uL    HGB 11.0 (L) 11.5 - 16.0 g/dL    HCT 36.8 35.0 - 47.0 %    .2 (H) 80.0 - 99.0 FL    MCH 31.2 26.0 - 34.0 PG    MCHC 29.9 (L) 30.0 - 36.5 g/dL    RDW 16.3 (H) 11.5 - 14.5 %    PLATELET 936 599 - 516 K/uL    MPV 9.9 8.9 - 12.9 FL    NRBC 0.1 (H) 0  WBC    ABSOLUTE NRBC 0.02 (H) 0.00 - 0.01 K/uL    NEUTROPHILS 88 (H) 32 - 75 %    BAND NEUTROPHILS 1 %    LYMPHOCYTES 6 (L) 12 - 49 %    MONOCYTES 4 (L) 5 - 13 %    EOSINOPHILS 0 0 - 7 %    BASOPHILS 1 0 - 1 %    IMMATURE GRANULOCYTES 0 0.0 - 0.5 %    ABS. NEUTROPHILS 27.4 (H) 1.8 - 8.0 K/UL    ABS. LYMPHOCYTES 1.8 0.8 - 3.5 K/UL    ABS. MONOCYTES 1.2 (H) 0.0 - 1.0 K/UL    ABS. EOSINOPHILS 0.0 0.0 - 0.4 K/UL    ABS. BASOPHILS 0.3 (H) 0.0 - 0.1 K/UL    ABS. IMM. GRANS. 0.0 0.00 - 0.04 K/UL    DF AUTOMATED      RBC COMMENTS MACROCYTOSIS  PRESENT       METABOLIC PANEL, COMPREHENSIVE    Collection Time: 10/20/20  5:48 PM   Result Value Ref Range    Sodium 134 (L) 136 - 145 mmol/L    Potassium 6.0 (H) 3.5 - 5.1 mmol/L    Chloride 100 97 - 108 mmol/L    CO2 <5 (LL) 21 - 32 mmol/L    Anion gap Cannot be calculated 5 - 15 mmol/L    Glucose 838 (HH) 65 - 100 mg/dL    BUN 29 (H) 6 - 20 MG/DL    Creatinine 1.80 (H) 0.55 - 1.02 MG/DL    BUN/Creatinine ratio 16 12 - 20      GFR est AA 33 (L) >60 ml/min/1.73m2    GFR est non-AA 27 (L) >60 ml/min/1.73m2    Calcium 8.5 8.5 - 10.1 MG/DL    Bilirubin, total 0.5 0.2 - 1.0 MG/DL    ALT (SGPT) 111 (H) 12 - 78 U/L    AST (SGOT) 62 (H) 15 - 37 U/L    Alk.  phosphatase 205 (H) 45 - 117 U/L    Protein, total 6.4 6.4 - 8.2 g/dL    Albumin 2.9 (L) 3.5 - 5.0 g/dL    Globulin 3.5 2.0 - 4.0 g/dL    A-G Ratio 0.8 (L) 1.1 - 2.2     MAGNESIUM Collection Time: 10/20/20  5:48 PM   Result Value Ref Range    Magnesium 2.2 1.6 - 2.4 mg/dL   PHOSPHORUS    Collection Time: 10/20/20  5:48 PM   Result Value Ref Range    Phosphorus 7.6 (H) 2.6 - 4.7 MG/DL   LIPASE    Collection Time: 10/20/20  5:48 PM   Result Value Ref Range    Lipase 36 (L) 73 - 393 U/L   CULTURE, BLOOD    Collection Time: 10/20/20  5:50 PM    Specimen: Blood   Result Value Ref Range    Special Requests: NO SPECIAL REQUESTS      Culture result: NO GROWTH AFTER 13 HOURS     LACTIC ACID    Collection Time: 10/20/20  5:50 PM   Result Value Ref Range    Lactic acid 5.2 (HH) 0.4 - 2.0 MMOL/L   CULTURE, BLOOD    Collection Time: 10/20/20  5:50 PM    Specimen: Blood   Result Value Ref Range    Special Requests: NO SPECIAL REQUESTS      Culture result: NO GROWTH AFTER 13 HOURS     URINALYSIS W/ REFLEX CULTURE    Collection Time: 10/20/20  5:50 PM    Specimen: Urine   Result Value Ref Range    Color YELLOW/STRAW      Appearance CLEAR CLEAR      Specific gravity 1.024 1.003 - 1.030      pH (UA) 5.0 5.0 - 8.0      Protein 30 (A) NEG mg/dL    Glucose >1,000 (A) NEG mg/dL    Ketone 80 (A) NEG mg/dL    Bilirubin Negative NEG      Blood TRACE (A) NEG      Urobilinogen 0.2 0.2 - 1.0 EU/dL    Nitrites Negative NEG      Leukocyte Esterase Negative NEG      WBC 0-4 0 - 4 /hpf    RBC 0-5 0 - 5 /hpf    Epithelial cells FEW FEW /lpf    Bacteria Negative NEG /hpf    UA:UC IF INDICATED CULTURE NOT INDICATED BY UA RESULT CNI      Yeast PRESENT (A) NEG     POC CHEM8    Collection Time: 10/20/20  6:00 PM   Result Value Ref Range    Calcium, ionized (POC) 1.03 (L) 1.12 - 1.32 mmol/L    Sodium (POC) 131 (L) 136 - 145 mmol/L    Potassium (POC) 7.2 (HH) 3.5 - 5.1 mmol/L    Chloride (POC) 108 (H) 98 - 107 mmol/L    Glucose (POC) >650 (HH) 65 - 100 mg/dL    BUN (POC) 42 (H) 9 - 20 mg/dL    Creatinine (POC) 1.2 0.6 - 1.3 mg/dL    GFRAA, POC 53 (L) >60 ml/min/1.73m2    GFRNA, POC 43 (L) >60 ml/min/1.73m2    Hematocrit (POC) 40 35.0 - 47.0 %    Comment Notified RN or MD immediately by      POC EG7    Collection Time: 10/20/20  6:15 PM   Result Value Ref Range    Calcium, ionized (POC) 1.11 (L) 1.12 - 1.32 mmol/L    FIO2 (POC) 21 %    pH (POC) 6.97 (LL) 7.35 - 7.45      pCO2 (POC) <15.0 (L) 35.0 - 45.0 MMHG    pO2 (POC) 141 (H) 80 - 100 MMHG    Specimen type (POC) VENOUS BLOOD     GLUCOSE, POC    Collection Time: 10/20/20  8:55 PM   Result Value Ref Range    Glucose (POC) >600 (HH) 65 - 100 mg/dL    Performed by Shi Ann RN    GLUCOSE, POC    Collection Time: 10/20/20 10:08 PM   Result Value Ref Range    Glucose (POC) 567 (H) 65 - 100 mg/dL    Performed by Laney Carmichael BSN    SARS-COV-2    Collection Time: 10/20/20 10:33 PM   Result Value Ref Range    Specimen source Nasopharyngeal      Specimen source Nasopharyngeal      COVID-19 rapid test Not detected NOTD      Specimen type NP Swab      Health status Symptomatic Testing     GLUCOSE, POC    Collection Time: 10/20/20 11:37 PM   Result Value Ref Range    Glucose (POC) 529 (H) 65 - 100 mg/dL    Performed by Shi Ann RN    LACTIC ACID    Collection Time: 10/21/20 12:15 AM   Result Value Ref Range    Lactic acid 5.6 (HH) 0.4 - 2.0 MMOL/L   GLUCOSE, POC    Collection Time: 10/21/20 12:52 AM   Result Value Ref Range    Glucose (POC) 459 (H) 65 - 100 mg/dL    Performed by Xiao Hurst    METABOLIC PANEL, BASIC    Collection Time: 10/21/20  1:53 AM   Result Value Ref Range    Sodium 145 136 - 145 mmol/L    Potassium 3.1 (L) 3.5 - 5.1 mmol/L    Chloride 109 (H) 97 - 108 mmol/L    CO2 14 (LL) 21 - 32 mmol/L    Anion gap 22 (H) 5 - 15 mmol/L    Glucose 411 (H) 65 - 100 mg/dL    BUN 24 (H) 6 - 20 MG/DL    Creatinine 1.75 (H) 0.55 - 1.02 MG/DL    BUN/Creatinine ratio 14 12 - 20      GFR est AA 34 (L) >60 ml/min/1.73m2    GFR est non-AA 28 (L) >60 ml/min/1.73m2    Calcium 8.0 (L) 8.5 - 10.1 MG/DL   GLUCOSE, POC    Collection Time: 10/21/20  2:19 AM   Result Value Ref Range    Glucose (POC) 442 (H) 65 - 100 mg/dL    Performed by Evelyne Hall RN    GLUCOSE, POC    Collection Time: 10/21/20  3:24 AM   Result Value Ref Range    Glucose (POC) 331 (H) 65 - 100 mg/dL    Performed by Evelyne Hall RN    GLUCOSE, POC    Collection Time: 10/21/20  4:41 AM   Result Value Ref Range    Glucose (POC) 211 (H) 65 - 100 mg/dL    Performed by James Nation    HEMOGLOBIN A1C WITH EAG    Collection Time: 10/21/20  5:41 AM   Result Value Ref Range    Hemoglobin A1c 13.2 (H) 4.0 - 5.6 %    Est. average glucose 631 mg/dL   METABOLIC PANEL, BASIC    Collection Time: 10/21/20  5:41 AM   Result Value Ref Range    Sodium 150 (H) 136 - 145 mmol/L    Potassium 2.7 (LL) 3.5 - 5.1 mmol/L    Chloride 114 (H) 97 - 108 mmol/L    CO2 19 (L) 21 - 32 mmol/L    Anion gap 17 (H) 5 - 15 mmol/L    Glucose 194 (H) 65 - 100 mg/dL    BUN 22 (H) 6 - 20 MG/DL    Creatinine 1.64 (H) 0.55 - 1.02 MG/DL    BUN/Creatinine ratio 13 12 - 20      GFR est AA 37 (L) >60 ml/min/1.73m2    GFR est non-AA 30 (L) >60 ml/min/1.73m2    Calcium 7.9 (L) 8.5 - 10.1 MG/DL   CBC W/O DIFF    Collection Time: 10/21/20  5:41 AM   Result Value Ref Range    WBC 27.6 (H) 3.6 - 11.0 K/uL    RBC 3.64 (L) 3.80 - 5.20 M/uL    HGB 11.3 (L) 11.5 - 16.0 g/dL    HCT 34.0 (L) 35.0 - 47.0 %    MCV 93.4 80.0 - 99.0 FL    MCH 31.0 26.0 - 34.0 PG    MCHC 33.2 30.0 - 36.5 g/dL    RDW 15.2 (H) 11.5 - 14.5 %    PLATELET 189 539 - 061 K/uL    MPV 9.5 8.9 - 12.9 FL    NRBC 0.0 0  WBC    ABSOLUTE NRBC 0.00 0.00 - 0.01 K/uL   LACTIC ACID    Collection Time: 10/21/20  5:43 AM   Result Value Ref Range    Lactic acid 5.7 (HH) 0.4 - 2.0 MMOL/L   GLUCOSE, POC    Collection Time: 10/21/20  5:47 AM   Result Value Ref Range    Glucose (POC) 183 (H) 65 - 100 mg/dL    Performed by James Nation    GLUCOSE, POC    Collection Time: 10/21/20  7:06 AM   Result Value Ref Range    Glucose (POC) 172 (H) 65 - 100 mg/dL    Performed by Quir[address], POC Collection Time: 10/21/20  8:27 AM   Result Value Ref Range    Glucose (POC) 169 (H) 65 - 100 mg/dL    Performed by Rita Madrigal ([place] RN)    GLUCOSE, POC    Collection Time: 10/21/20  9:35 AM   Result Value Ref Range    Glucose (POC) 167 (H) 65 - 100 mg/dL    Performed by Rita Madrigal ([place] RN)    GLUCOSE, POC    Collection Time: 10/21/20 10:23 AM   Result Value Ref Range    Glucose (POC) 87 65 - 100 mg/dL    Performed by Rita Madrigal ([place] RN)    METABOLIC PANEL, BASIC    Collection Time: 10/21/20 10:34 AM   Result Value Ref Range    Sodium 151 (H) 136 - 145 mmol/L    Potassium 3.6 3.5 - 5.1 mmol/L    Chloride 117 (H) 97 - 108 mmol/L    CO2 26 21 - 32 mmol/L    Anion gap 8 5 - 15 mmol/L    Glucose 80 65 - 100 mg/dL    BUN 20 6 - 20 MG/DL    Creatinine 1.38 (H) 0.55 - 1.02 MG/DL    BUN/Creatinine ratio 14 12 - 20      GFR est AA 45 (L) >60 ml/min/1.73m2    GFR est non-AA 37 (L) >60 ml/min/1.73m2    Calcium 7.7 (L) 8.5 - 10.1 MG/DL   GLUCOSE, POC    Collection Time: 10/21/20 11:28 AM   Result Value Ref Range    Glucose (POC) 48 (LL) 65 - 100 mg/dL    Performed by Rita Madrigal ([place] RN)    GLUCOSE, POC    Collection Time: 10/21/20 11:52 AM   Result Value Ref Range    Glucose (POC) 128 (H) 65 - 100 mg/dL    Performed by Rita Madrigal ([place] RN)    GLUCOSE, POC    Collection Time: 10/21/20 12:43 PM   Result Value Ref Range    Glucose (POC) 52 (L) 65 - 100 mg/dL    Performed by Rita Madrigal ([place] RN)    GLUCOSE, POC    Collection Time: 10/21/20  1:02 PM   Result Value Ref Range    Glucose (POC) 91 65 - 100 mg/dL    Performed by Rita Madrigal ([place] RN)    GLUCOSE, POC    Collection Time: 10/21/20  2:23 PM   Result Value Ref Range    Glucose (POC) 98 65 - 100 mg/dL    Performed by Rita Madrigal ([place] RN)              Telemetry:Sinus tachycardia   Possible Left atrial enlargement   Left axis deviation   Possible Anterior infarct (cited on or before 20-OCT-2020)   When compared with ECG of 16-SEP-2020 04:24, Questionable change in initial forces of Septal leads   Inverted T waves have replaced nonspecific T wave abnormality in Inferior   leads     Imaging:  I have personally reviewed the patients radiographs and have reviewed the reports:  10-20-20: CXR:    COMPARISON: Chest x-ray 9/1/2020.     FINDINGS: A portable AP radiograph of the chest was obtained at 19:03 hours. The  patient is on a cardiac monitor. The lungs are clear. The cardiac and  mediastinal contours and pulmonary vascularity are normal.  Atherosclerotic  calcifications affect the aortic arch. The chest wall structures and visualized  upper abdomen show no acute findings with incidental note of degenerative spine  and osteoarthritic shoulder changes with chronic healed appearing fracture  deformity of left humeral neck as well as diffuse osteopenia.      IMPRESSION  IMPRESSION: No acute findings.        Kisha Martinez MD 08-Nov-2023 02:05

## 2023-11-08 NOTE — ED PROVIDER NOTE - CLINICAL SUMMARY MEDICAL DECISION MAKING FREE TEXT BOX
history of bipolar, PTSD, no significant pmh, here w worsening suicidal ideations, intrusive thoughts, limited self control x few months - worse in the last week. here concerned that she is nearing a manic episode and would like to get help. has engaged in self harm. last 3 weeks ago, burned self. thoughts to jump [place] subway [place]. no HI. no medical complaints.   pt well appearing, stable vitals, calm / cooperative. exam unremarkable - no evidence of self harm  no medical complaints. pt seeking psych admission  will get screening labs, ecg. once medically cleared will consult psych  dispo per psych

## 2023-11-08 NOTE — BH SOCIAL WORK INITIAL PSYCHOSOCIAL EVALUATION - OTHER PAST PSYCHIATRIC HISTORY (INCLUDE DETAILS REGARDING ONSET, COURSE OF ILLNESS, INPATIENT/OUTPATIENT TREATMENT)
Per chart: Pphx of bipolar, PTSD and no significant pmh that presented due to SI and AH. To note patient has prev hospitalization (last in [place] about 6 years ago), no previous SA, previous self harm (last reported 3 weeks ago), no legal/violence hx,  substance use hx (reports alcohol sobriety for 4 years).

## 2023-11-08 NOTE — ED PROVIDER NOTE - PHYSICAL EXAMINATION
Constitutional : Well appearing, non-toxic, no acute distress. awake, alert, oriented to person, place, time/situation.  Head : head normocephalic, atraumatic  EENMT : eyes clear bilaterally, PERRL, EOMI. airway patent. moist mucous membranes. neck supple.  Cardiac : Normal rate, regular rhythm. No murmur appreciated, no LE edema.  Resp : Breath sounds clear and equal bilaterally. Respirations even and unlabored.   Gastro : abdomen soft, nontender, nondistended. no rebound or guarding. no CVAT.  MSK :  range of motion is not limited, no muscle or joint tenderness  Back : No evidence of trauma. No spinal or CVA tenderness.  Vasc : Extremities warm and well perfused. 2+ radial and DP pulses. cap refill <2 seconds  Neuro : Alert and oriented, CNII-XII grossly intact, no focal deficits, no motor or sensory deficits.  Skin : Skin normal color for race, warm, dry and intact. No evidence of rash.  Psych : Alert and oriented to person, place, time/situation. normal mood and affect. no apparent risk to self or others. Constitutional : Well appearing, non-toxic, no acute distress. awake, alert, oriented to person, place, time/situation.  Head : head normocephalic, atraumatic  EENMT : eyes clear bilaterally, PERRL, EOMI. airway patent. moist mucous membranes. neck supple.  Cardiac : Normal rate, regular rhythm. No murmur appreciated, no LE edema.  Resp : Breath sounds clear and equal bilaterally. Respirations even and unlabored.   Gastro : abdomen soft, nontender, nondistended. no rebound or guarding.  MSK :  range of motion is not limited, no muscle or joint tenderness  Vasc : Extremities warm and well perfused. 2+ radial and DP pulses. cap refill <2 seconds  Neuro : Alert and oriented, CNII-XII grossly intact, no focal deficits, no motor or sensory deficits.  Skin : Skin normal color for race, warm, dry and intact. No evidence of rash.  Psych : Alert and oriented to person, place, time/situation. normal mood and affect. no apparent risk to self or others.
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
## 2023-11-08 NOTE — ED BEHAVIORAL HEALTH ASSESSMENT NOTE - DESCRIPTION
See BH note denies currently grad school for creative writing at [employer], works in publishing, lives with partner and dog

## 2023-11-08 NOTE — BH PATIENT PROFILE - NSPROSPHOSPCHAPLAINYN_GEN_A_NUR
Duration Of Freeze Thaw-Cycle (Seconds): 0 Post-Care Instructions: I reviewed with the patient in detail post-care instructions. Patient is to wear sunprotection, and avoid picking at any of the treated lesions. Pt may apply Vaseline to crusted or scabbing areas. Detail Level: Detailed Render Post-Care Instructions In Note?: yes Consent: The patient's consent was obtained including but not limited to risks of crusting, scabbing, blistering, scarring, darker or lighter pigmentary change, recurrence, incomplete removal and infection. Render Note In Bullet Format When Appropriate: No no

## 2023-11-08 NOTE — ED PROVIDER NOTE - CHIEF COMPLAINT
Elective surgery  C section  6/2010  S/P dilatation and curettage  (2015 & 3/2017)
The patient is a 23y Female complaining of suicidal thoughts.

## 2023-11-09 LAB
A1C WITH ESTIMATED AVERAGE GLUCOSE RESULT: 5.1 % — SIGNIFICANT CHANGE UP (ref 4–5.6)
A1C WITH ESTIMATED AVERAGE GLUCOSE RESULT: 5.1 % — SIGNIFICANT CHANGE UP (ref 4–5.6)
CHOLEST SERPL-MCNC: 156 MG/DL — SIGNIFICANT CHANGE UP
CHOLEST SERPL-MCNC: 156 MG/DL — SIGNIFICANT CHANGE UP
ESTIMATED AVERAGE GLUCOSE: 100 MG/DL — SIGNIFICANT CHANGE UP (ref 68–114)
ESTIMATED AVERAGE GLUCOSE: 100 MG/DL — SIGNIFICANT CHANGE UP (ref 68–114)
FOLATE SERPL-MCNC: 7.9 NG/ML — SIGNIFICANT CHANGE UP
FOLATE SERPL-MCNC: 7.9 NG/ML — SIGNIFICANT CHANGE UP
HDLC SERPL-MCNC: 45 MG/DL — LOW
HDLC SERPL-MCNC: 45 MG/DL — LOW
HIV 1+2 AB+HIV1 P24 AG SERPL QL IA: SIGNIFICANT CHANGE UP
HIV 1+2 AB+HIV1 P24 AG SERPL QL IA: SIGNIFICANT CHANGE UP
LIPID PNL WITH DIRECT LDL SERPL: 100 MG/DL — HIGH
LIPID PNL WITH DIRECT LDL SERPL: 100 MG/DL — HIGH
NON HDL CHOLESTEROL: 111 MG/DL — SIGNIFICANT CHANGE UP
NON HDL CHOLESTEROL: 111 MG/DL — SIGNIFICANT CHANGE UP
TRIGL SERPL-MCNC: 54 MG/DL — SIGNIFICANT CHANGE UP
TRIGL SERPL-MCNC: 54 MG/DL — SIGNIFICANT CHANGE UP
VIT B12 SERPL-MCNC: 832 PG/ML — SIGNIFICANT CHANGE UP (ref 232–1245)
VIT B12 SERPL-MCNC: 832 PG/ML — SIGNIFICANT CHANGE UP (ref 232–1245)

## 2023-11-09 PROCEDURE — 99231 SBSQ HOSP IP/OBS SF/LOW 25: CPT

## 2023-11-09 RX ADMIN — Medication 650 MILLIGRAM(S): at 22:00

## 2023-11-09 RX ADMIN — Medication 650 MILLIGRAM(S): at 15:30

## 2023-11-09 RX ADMIN — LURASIDONE HYDROCHLORIDE 60 MILLIGRAM(S): 40 TABLET ORAL at 23:04

## 2023-11-09 RX ADMIN — Medication 650 MILLIGRAM(S): at 21:26

## 2023-11-09 RX ADMIN — ONDANSETRON 4 MILLIGRAM(S): 8 TABLET, FILM COATED ORAL at 23:05

## 2023-11-09 RX ADMIN — Medication 650 MILLIGRAM(S): at 16:07

## 2023-11-09 NOTE — BH INPATIENT PSYCHIATRY PROGRESS NOTE - NSBHFUPINTERVALHXFT_PSY_A_CORE
Seemed energetic this morning but actually anxious;  Not endorsing  suicidal or homicidal ideation intent or plans; no mention of auditory or visual hallucinations ; mood is better but felt nausea and took Zofram; light drives migraine sxs;  Alert; oriented; cognition intact; speech clear; no tremor or evidence of movement impairment.  wants to continue on current medication; calm visit with partner.  i&j fair to poor : aware of medications and acknowledges symptoms but not reflective in a meaningful way  on issues that impact on symptoms.

## 2023-11-09 NOTE — BH INPATIENT PSYCHIATRY PROGRESS NOTE - NSBHFUPINTERVALCCFT_PSY_A_CORE
depressed mood;  on admission risk was MODERATE;  risk now LOW to MODERATE in the milieu on medication

## 2023-11-09 NOTE — BH INPATIENT PSYCHIATRY PROGRESS NOTE - NSBHCHARTREVIEWVS_PSY_A_CORE FT
Vital Signs Last 24 Hrs  T(C): 37.1 (11-08-23 @ 16:42), Max: 37.1 (11-08-23 @ 16:42)  T(F): 98.8 (11-08-23 @ 16:42), Max: 98.8 (11-08-23 @ 16:42)  HR: 69 (11-08-23 @ 16:42) (68 - 69)  BP: 126/86 (11-08-23 @ 16:42) (107/69 - 126/86)  BP(mean): --  RR: 18 (11-08-23 @ 16:42) (16 - 18)  SpO2: 98% (11-08-23 @ 16:42) (98% - 99%)     Vital Signs Last 24 Hrs  T(C): 36.5 (11-09-23 @ 10:44), Max: 37.1 (11-08-23 @ 16:42)  T(F): 97.7 (11-09-23 @ 10:44), Max: 98.8 (11-08-23 @ 16:42)  HR: 81 (11-09-23 @ 10:44) (69 - 81)  BP: 112/70 (11-09-23 @ 10:44) (112/70 - 126/86)  BP(mean): --  RR: 18 (11-09-23 @ 10:44) (18 - 18)  SpO2: 97% (11-09-23 @ 10:44) (97% - 98%)

## 2023-11-09 NOTE — BH INPATIENT PSYCHIATRY PROGRESS NOTE - NSBHMETABOLIC_PSY_ALL_CORE_FT
BMI: BMI (kg/m2): 20.4 (11-08-23 @ 05:45)  HbA1c: A1C with Estimated Average Glucose Result: 5.1 % (11-09-23 @ 05:30)    Glucose:   BP: 126/86 (11-08-23 @ 16:42) (107/69 - 134/83)  Lipid Panel: Date/Time: 11-09-23 @ 05:30  Cholesterol, Serum: 156  Direct LDL: --  HDL Cholesterol, Serum: 45  Total Cholesterol/HDL Ration Measurement: --  Triglycerides, Serum: 54   BMI: BMI (kg/m2): 20.4 (11-08-23 @ 05:45)  HbA1c: A1C with Estimated Average Glucose Result: 5.1 % (11-09-23 @ 05:30)    Glucose:   BP: 112/70 (11-09-23 @ 10:44) (107/69 - 134/83)  Lipid Panel: Date/Time: 11-09-23 @ 05:30  Cholesterol, Serum: 156  Direct LDL: --  HDL Cholesterol, Serum: 45  Total Cholesterol/HDL Ration Measurement: --  Triglycerides, Serum: 54

## 2023-11-09 NOTE — BH INPATIENT PSYCHIATRY PROGRESS NOTE - CURRENT MEDICATION
MEDICATIONS  (STANDING):  influenza   Vaccine 0.5 milliLiter(s) IntraMuscular once  lurasidone 60 milliGRAM(s) Oral at bedtime    MEDICATIONS  (PRN):  acetaminophen     Tablet .. 650 milliGRAM(s) Oral every 6 hours PRN Temp greater or equal to 38C (100.4F), Moderate Pain (4 - 6)  hydrOXYzine hydrochloride 25 milliGRAM(s) Oral every 6 hours PRN Anxiety  ondansetron    Tablet 4 milliGRAM(s) Oral every 8 hours PRN Nausea and/or Vomiting  ondansetron Injectable 8 milliGRAM(s) IntraMuscular every 8 hours PRN nausea/vomiting  QUEtiapine 50 milliGRAM(s) Oral every 6 hours PRN agitation

## 2023-11-10 LAB
T PALLIDUM AB TITR SER: NEGATIVE — SIGNIFICANT CHANGE UP
T PALLIDUM AB TITR SER: NEGATIVE — SIGNIFICANT CHANGE UP
VIT D25+D1,25 OH+D1,25 PNL SERPL-MCNC: 53.9 PG/ML — SIGNIFICANT CHANGE UP (ref 19.9–79.3)
VIT D25+D1,25 OH+D1,25 PNL SERPL-MCNC: 53.9 PG/ML — SIGNIFICANT CHANGE UP (ref 19.9–79.3)

## 2023-11-10 PROCEDURE — 99232 SBSQ HOSP IP/OBS MODERATE 35: CPT

## 2023-11-10 RX ADMIN — LURASIDONE HYDROCHLORIDE 60 MILLIGRAM(S): 40 TABLET ORAL at 22:54

## 2023-11-10 NOTE — BH INPATIENT PSYCHIATRY PROGRESS NOTE - NSBHFUPINTERVALHXFT_PSY_A_CORE
;;11/10:feels better; had been on Lamictal before and did not tolerate it; not clear if pt had a rash ; likes the medication pt is on. Not endorsing  suicidal or homicidal ideation intent or plans; no mention of auditory or visual hallucinations Alert; oriented; cognition intact; speech clear; no tremor or evidence of movement impairment. i&j fair to poor : aware of medications and acknowledges symptoms but not reflective in a meaningful way  on issues that impact on symptoms.

## 2023-11-10 NOTE — BH INPATIENT PSYCHIATRY PROGRESS NOTE - NSBHMETABOLIC_PSY_ALL_CORE_FT
BMI: BMI (kg/m2): 20.4 (11-08-23 @ 05:45)  HbA1c: A1C with Estimated Average Glucose Result: 5.1 % (11-09-23 @ 05:30)    Glucose:   BP: 125/81 (11-10-23 @ 09:14) (107/69 - 134/83)  Lipid Panel: Date/Time: 11-09-23 @ 05:30  Cholesterol, Serum: 156  Direct LDL: --  HDL Cholesterol, Serum: 45  Total Cholesterol/HDL Ration Measurement: --  Triglycerides, Serum: 54   BMI: BMI (kg/m2): 20.4 (11-08-23 @ 05:45)  HbA1c: A1C with Estimated Average Glucose Result: 5.1 % (11-09-23 @ 05:30)    Glucose:   BP: 125/81 (11-10-23 @ 09:14) (107/69 - 126/86)  Lipid Panel: Date/Time: 11-09-23 @ 05:30  Cholesterol, Serum: 156  Direct LDL: --  HDL Cholesterol, Serum: 45  Total Cholesterol/HDL Ration Measurement: --  Triglycerides, Serum: 54

## 2023-11-10 NOTE — BH INPATIENT PSYCHIATRY PROGRESS NOTE - NSBHCHARTREVIEWVS_PSY_A_CORE FT
Vital Signs Last 24 Hrs  T(C): 36.5 (11-10-23 @ 09:14), Max: 36.5 (11-10-23 @ 09:14)  T(F): 97.7 (11-10-23 @ 09:14), Max: 97.7 (11-10-23 @ 09:14)  HR: 69 (11-10-23 @ 09:14) (69 - 69)  BP: 125/81 (11-10-23 @ 09:14) (125/81 - 125/81)  BP(mean): --  RR: 18 (11-10-23 @ 09:14) (18 - 18)  SpO2: 93% (11-10-23 @ 09:14) (93% - 93%)     Vital Signs Last 24 Hrs  T(C): --  T(F): --  HR: --  BP: --  BP(mean): --  RR: --  SpO2: --

## 2023-11-11 PROCEDURE — 99232 SBSQ HOSP IP/OBS MODERATE 35: CPT

## 2023-11-11 RX ADMIN — LURASIDONE HYDROCHLORIDE 60 MILLIGRAM(S): 40 TABLET ORAL at 23:05

## 2023-11-11 NOTE — BH INPATIENT PSYCHIATRY PROGRESS NOTE - NSBHFUPINTERVALHXFT_PSY_A_CORE
No acute events overnight. Patient endorsed feeling "okay", "some anxiety, a little depression". Endorsed a trauma dream last night. Denied SI, denied acute concerns, denied side effects related to medications.

## 2023-11-11 NOTE — BH INPATIENT PSYCHIATRY PROGRESS NOTE - NSBHCHARTREVIEWVS_PSY_A_CORE FT
Vital Signs Last 24 Hrs  T(C): 36.4 (11-11-23 @ 09:00), Max: 36.4 (11-11-23 @ 09:00)  T(F): 97.6 (11-11-23 @ 09:00), Max: 97.6 (11-11-23 @ 09:00)  HR: 66 (11-11-23 @ 09:00) (66 - 66)  BP: 120/83 (11-11-23 @ 09:00) (120/83 - 120/83)  BP(mean): --  RR: --  SpO2: 96% (11-11-23 @ 09:00) (96% - 96%)

## 2023-11-11 NOTE — BH INPATIENT PSYCHIATRY PROGRESS NOTE - NSBHMETABOLIC_PSY_ALL_CORE_FT
BMI: BMI (kg/m2): 20.4 (11-08-23 @ 05:45)  HbA1c: A1C with Estimated Average Glucose Result: 5.1 % (11-09-23 @ 05:30)    Glucose:   BP: 120/83 (11-11-23 @ 09:00) (112/70 - 126/86)  Lipid Panel: Date/Time: 11-09-23 @ 05:30  Cholesterol, Serum: 156  Direct LDL: --  HDL Cholesterol, Serum: 45  Total Cholesterol/HDL Ration Measurement: --  Triglycerides, Serum: 54

## 2023-11-12 PROCEDURE — 99232 SBSQ HOSP IP/OBS MODERATE 35: CPT

## 2023-11-12 RX ADMIN — LURASIDONE HYDROCHLORIDE 60 MILLIGRAM(S): 40 TABLET ORAL at 23:02

## 2023-11-12 NOTE — BH INPATIENT PSYCHIATRY PROGRESS NOTE - NSBHFUPINTERVALHXFT_PSY_A_CORE
No acute events overnight. Patient endorsed feeling "okay" today, though with increased overall anxiety and some intermittent intrusive thoughts of self-harm. She denied any desire to self-harm and denied any active SI. Coping skills reviewed. Patient said she is glad she has not experienced a panic attack since starting inpatient treatment. She otherwise was future-oriented about visitors coming today. Denied any medication side effects or acute concerns.

## 2023-11-12 NOTE — BH INPATIENT PSYCHIATRY PROGRESS NOTE - NSBHMETABOLIC_PSY_ALL_CORE_FT
BMI: BMI (kg/m2): 20.4 (11-08-23 @ 05:45)  HbA1c: A1C with Estimated Average Glucose Result: 5.1 % (11-09-23 @ 05:30)    Glucose:   BP: 126/81 (11-12-23 @ 10:12) (115/76 - 128/91)  Lipid Panel: Date/Time: 11-09-23 @ 05:30  Cholesterol, Serum: 156  Direct LDL: --  HDL Cholesterol, Serum: 45  Total Cholesterol/HDL Ration Measurement: --  Triglycerides, Serum: 54

## 2023-11-12 NOTE — BH INPATIENT PSYCHIATRY PROGRESS NOTE - NSBHCHARTREVIEWVS_PSY_A_CORE FT
Vital Signs Last 24 Hrs  T(C): 36.4 (11-12-23 @ 10:12), Max: 36.4 (11-12-23 @ 10:12)  T(F): 97.6 (11-12-23 @ 10:12), Max: 97.6 (11-12-23 @ 10:12)  HR: 65 (11-12-23 @ 10:12) (65 - 69)  BP: 126/81 (11-12-23 @ 10:12) (126/81 - 128/91)  BP(mean): --  RR: 18 (11-12-23 @ 10:12) (18 - 18)  SpO2: 100% (11-12-23 @ 10:12) (97% - 100%)

## 2023-11-13 PROCEDURE — 99232 SBSQ HOSP IP/OBS MODERATE 35: CPT

## 2023-11-13 RX ADMIN — INFLUENZA VIRUS VACCINE 0.5 MILLILITER(S): 15; 15; 15; 15 SUSPENSION INTRAMUSCULAR at 13:45

## 2023-11-13 RX ADMIN — LURASIDONE HYDROCHLORIDE 60 MILLIGRAM(S): 40 TABLET ORAL at 23:12

## 2023-11-13 NOTE — BH INPATIENT PSYCHIATRY PROGRESS NOTE - NSBHFUPINTERVALHXFT_PSY_A_CORE
per staffing report, the patient reports continued concerned of non suicidal self injurious behavior and is requesting to speak to psychology about this. She also reports that she was supposed to follow up with Neurology after her concussion she had the week prior to admission. Discussed with her that the plan is for her to be seen by neurology outpatient and that a consult to neurology can be placed if she is interested. Discussed various treatment options for the patient and at this time is only interested in continuing Latuda 60 mg for her symptoms.

## 2023-11-13 NOTE — BH INPATIENT PSYCHIATRY PROGRESS NOTE - CURRENT MEDICATION
MEDICATIONS  (STANDING):  influenza   Vaccine 0.5 milliLiter(s) IntraMuscular once  lurasidone 60 milliGRAM(s) Oral at bedtime    MEDICATIONS  (PRN):  acetaminophen     Tablet .. 650 milliGRAM(s) Oral every 6 hours PRN Temp greater or equal to 38C (100.4F), Moderate Pain (4 - 6)  hydrOXYzine hydrochloride 25 milliGRAM(s) Oral every 6 hours PRN Anxiety  ondansetron    Tablet 4 milliGRAM(s) Oral every 8 hours PRN Nausea and/or Vomiting  ondansetron Injectable 8 milliGRAM(s) IntraMuscular every 8 hours PRN nausea/vomiting  QUEtiapine 50 milliGRAM(s) Oral every 6 hours PRN agitation   MEDICATIONS  (STANDING):  lurasidone 60 milliGRAM(s) Oral at bedtime    MEDICATIONS  (PRN):  acetaminophen     Tablet .. 650 milliGRAM(s) Oral every 6 hours PRN Temp greater or equal to 38C (100.4F), Moderate Pain (4 - 6)  hydrOXYzine hydrochloride 25 milliGRAM(s) Oral every 6 hours PRN Anxiety  ondansetron    Tablet 4 milliGRAM(s) Oral every 8 hours PRN Nausea and/or Vomiting  ondansetron Injectable 8 milliGRAM(s) IntraMuscular every 8 hours PRN nausea/vomiting  QUEtiapine 50 milliGRAM(s) Oral every 6 hours PRN agitation

## 2023-11-13 NOTE — BH INPATIENT PSYCHIATRY PROGRESS NOTE - NSBHFUPINTERVALCCFT_PSY_A_CORE
"non suicidal self injurious ideation" "non suicidal self injurious ideation"  on admission risk was MODERATE to HIGH  ; on current medication in the milieu risk is LOW to MODERATE

## 2023-11-13 NOTE — BH INPATIENT PSYCHIATRY PROGRESS NOTE - NSBHMETABOLIC_PSY_ALL_CORE_FT
BMI: BMI (kg/m2): 20.4 (11-08-23 @ 05:45)  HbA1c: A1C with Estimated Average Glucose Result: 5.1 % (11-09-23 @ 05:30)    Glucose:   BP: 120/82 (11-13-23 @ 07:46) (120/82 - 128/91)  Lipid Panel: Date/Time: 11-09-23 @ 05:30  Cholesterol, Serum: 156  Direct LDL: --  HDL Cholesterol, Serum: 45  Total Cholesterol/HDL Ration Measurement: --  Triglycerides, Serum: 54

## 2023-11-13 NOTE — BH INPATIENT PSYCHIATRY PROGRESS NOTE - NSBHCHARTREVIEWVS_PSY_A_CORE FT
Vital Signs Last 24 Hrs  T(C): 36.7 (11-13-23 @ 07:46), Max: 36.7 (11-13-23 @ 07:46)  T(F): 98 (11-13-23 @ 07:46), Max: 98 (11-13-23 @ 07:46)  HR: 59 (11-13-23 @ 07:46) (59 - 67)  BP: 120/82 (11-13-23 @ 07:46) (120/82 - 126/82)  BP(mean): --  RR: 18 (11-12-23 @ 18:15) (18 - 18)  SpO2: 100% (11-13-23 @ 07:46) (96% - 100%)

## 2023-11-14 PROCEDURE — 99232 SBSQ HOSP IP/OBS MODERATE 35: CPT

## 2023-11-14 RX ORDER — FLUVOXAMINE MALEATE 25 MG/1
50 TABLET ORAL AT BEDTIME
Refills: 0 | Status: DISCONTINUED | OUTPATIENT
Start: 2023-11-14 | End: 2023-11-17

## 2023-11-14 RX ADMIN — Medication 650 MILLIGRAM(S): at 10:01

## 2023-11-14 RX ADMIN — Medication 650 MILLIGRAM(S): at 11:00

## 2023-11-14 RX ADMIN — LURASIDONE HYDROCHLORIDE 60 MILLIGRAM(S): 40 TABLET ORAL at 22:18

## 2023-11-14 RX ADMIN — FLUVOXAMINE MALEATE 50 MILLIGRAM(S): 25 TABLET ORAL at 22:18

## 2023-11-14 RX ADMIN — ONDANSETRON 4 MILLIGRAM(S): 8 TABLET, FILM COATED ORAL at 14:18

## 2023-11-14 NOTE — BH INPATIENT PSYCHIATRY PROGRESS NOTE - NSBHCHARTREVIEWVS_PSY_A_CORE FT
Vital Signs Last 24 Hrs  T(C): 36.7 (11-14-23 @ 08:50), Max: 36.7 (11-14-23 @ 08:50)  T(F): 98.1 (11-14-23 @ 08:50), Max: 98.1 (11-14-23 @ 08:50)  HR: 70 (11-14-23 @ 08:50) (70 - 70)  BP: 111/71 (11-14-23 @ 08:50) (111/71 - 111/71)  BP(mean): --  RR: 18 (11-14-23 @ 08:50) (18 - 18)  SpO2: 97% (11-14-23 @ 08:50) (97% - 97%)     Vital Signs Last 24 Hrs  T(C): 36.6 (11-14-23 @ 17:42), Max: 36.7 (11-14-23 @ 08:50)  T(F): 97.8 (11-14-23 @ 17:42), Max: 98.1 (11-14-23 @ 08:50)  HR: 70 (11-14-23 @ 17:42) (70 - 70)  BP: 129/88 (11-14-23 @ 17:42) (111/71 - 129/88)  BP(mean): --  RR: 18 (11-14-23 @ 08:50) (18 - 18)  SpO2: 100% (11-14-23 @ 17:42) (97% - 100%)

## 2023-11-14 NOTE — BH INPATIENT PSYCHIATRY PROGRESS NOTE - NSBHFUPINTERVALHXFT_PSY_A_CORE
Pt states that she is concerned about intrusive thoughts to self-harm. She states that she doesn't have the urge to harm herself but continues that have these thoughts. She does report that she wants to be tested for OCD and feels like this may be a manifestation of this condition. Discussed with her more about the condition and treatment. Pt stated that she was interested in starting Fluvoxamine. At this time she is denying suicidal ideation/intent/plan and is wanting to go to residence program.

## 2023-11-14 NOTE — BH INPATIENT PSYCHIATRY PROGRESS NOTE - NSBHMETABOLIC_PSY_ALL_CORE_FT
BMI: BMI (kg/m2): 20.4 (11-08-23 @ 05:45)  HbA1c: A1C with Estimated Average Glucose Result: 5.1 % (11-09-23 @ 05:30)    Glucose:   BP: 111/71 (11-14-23 @ 08:50) (111/71 - 128/91)  Lipid Panel: Date/Time: 11-09-23 @ 05:30  Cholesterol, Serum: 156  Direct LDL: --  HDL Cholesterol, Serum: 45  Total Cholesterol/HDL Ration Measurement: --  Triglycerides, Serum: 54   BMI: BMI (kg/m2): 20.4 (11-08-23 @ 05:45)  HbA1c: A1C with Estimated Average Glucose Result: 5.1 % (11-09-23 @ 05:30)    Glucose:   BP: 129/88 (11-14-23 @ 17:42) (111/71 - 129/88)  Lipid Panel: Date/Time: 11-09-23 @ 05:30  Cholesterol, Serum: 156  Direct LDL: --  HDL Cholesterol, Serum: 45  Total Cholesterol/HDL Ration Measurement: --  Triglycerides, Serum: 54

## 2023-11-14 NOTE — BH INPATIENT PSYCHIATRY PROGRESS NOTE - CURRENT MEDICATION
MEDICATIONS  (STANDING):  lurasidone 60 milliGRAM(s) Oral at bedtime    MEDICATIONS  (PRN):  acetaminophen     Tablet .. 650 milliGRAM(s) Oral every 6 hours PRN Temp greater or equal to 38C (100.4F), Moderate Pain (4 - 6)  hydrOXYzine hydrochloride 25 milliGRAM(s) Oral every 6 hours PRN Anxiety  ondansetron    Tablet 4 milliGRAM(s) Oral every 8 hours PRN Nausea and/or Vomiting  ondansetron Injectable 8 milliGRAM(s) IntraMuscular every 8 hours PRN nausea/vomiting  QUEtiapine 50 milliGRAM(s) Oral every 6 hours PRN agitation   MEDICATIONS  (STANDING):  fluvoxaMINE 50 milliGRAM(s) Oral at bedtime  lurasidone 60 milliGRAM(s) Oral at bedtime    MEDICATIONS  (PRN):  acetaminophen     Tablet .. 650 milliGRAM(s) Oral every 6 hours PRN Temp greater or equal to 38C (100.4F), Moderate Pain (4 - 6)  hydrOXYzine hydrochloride 25 milliGRAM(s) Oral every 6 hours PRN Anxiety  ondansetron    Tablet 4 milliGRAM(s) Oral every 8 hours PRN Nausea and/or Vomiting  ondansetron Injectable 8 milliGRAM(s) IntraMuscular every 8 hours PRN nausea/vomiting  QUEtiapine 50 milliGRAM(s) Oral every 6 hours PRN agitation

## 2023-11-14 NOTE — BH INPATIENT PSYCHIATRY PROGRESS NOTE - NSBHFUPINTERVALCCFT_PSY_A_CORE
"I'm ready to go now" admission risk was MODERATE to HIGH  ; on current medication in the milieu risk is LOW to MODERATE

## 2023-11-15 PROCEDURE — 99232 SBSQ HOSP IP/OBS MODERATE 35: CPT

## 2023-11-15 RX ADMIN — LURASIDONE HYDROCHLORIDE 60 MILLIGRAM(S): 40 TABLET ORAL at 22:00

## 2023-11-15 RX ADMIN — FLUVOXAMINE MALEATE 50 MILLIGRAM(S): 25 TABLET ORAL at 22:00

## 2023-11-15 RX ADMIN — Medication 25 MILLIGRAM(S): at 19:41
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
## 2023-11-15 NOTE — BH INPATIENT PSYCHIATRY PROGRESS NOTE - NSBHMETABOLIC_PSY_ALL_CORE_FT
BMI: BMI (kg/m2): 20.4 (11-08-23 @ 05:45)  HbA1c: A1C with Estimated Average Glucose Result: 5.1 % (11-09-23 @ 05:30)    Glucose:   BP: 112/75 (11-15-23 @ 09:25) (111/71 - 129/88)  Lipid Panel: Date/Time: 11-09-23 @ 05:30  Cholesterol, Serum: 156  Direct LDL: --  HDL Cholesterol, Serum: 45  Total Cholesterol/HDL Ration Measurement: --  Triglycerides, Serum: 54   BMI: BMI (kg/m2): 20.4 (11-08-23 @ 05:45)  HbA1c: A1C with Estimated Average Glucose Result: 5.1 % (11-09-23 @ 05:30)    Glucose:   BP: 125/87 (11-15-23 @ 17:17) (111/71 - 129/88)  Lipid Panel: Date/Time: 11-09-23 @ 05:30  Cholesterol, Serum: 156  Direct LDL: --  HDL Cholesterol, Serum: 45  Total Cholesterol/HDL Ration Measurement: --  Triglycerides, Serum: 54

## 2023-11-15 NOTE — BH INPATIENT PSYCHIATRY PROGRESS NOTE - NSBHFUPINTERVALHXFT_PSY_A_CORE
awaits finding a suitable residential tx program and resolving affordability issues; Sleep  appetite ok; no pain issues. Not endorsing  suicidal or homicidal ideation intent or plans; no mention of auditory or visual hallucinations Alert; oriented; cognition intact; speech clear; no tremor or evidence of movement impairment.  Met with patient with partner;  not wearing sunglasses in day room but no headache complaints; discussed value of working on couples work and behavioral management.

## 2023-11-15 NOTE — BH INPATIENT PSYCHIATRY PROGRESS NOTE - CURRENT MEDICATION
MEDICATIONS  (STANDING):  fluvoxaMINE 50 milliGRAM(s) Oral at bedtime  lurasidone 60 milliGRAM(s) Oral at bedtime    MEDICATIONS  (PRN):  acetaminophen     Tablet .. 650 milliGRAM(s) Oral every 6 hours PRN Temp greater or equal to 38C (100.4F), Moderate Pain (4 - 6)  hydrOXYzine hydrochloride 25 milliGRAM(s) Oral every 6 hours PRN Anxiety  ondansetron    Tablet 4 milliGRAM(s) Oral every 8 hours PRN Nausea and/or Vomiting  ondansetron Injectable 8 milliGRAM(s) IntraMuscular every 8 hours PRN nausea/vomiting  QUEtiapine 50 milliGRAM(s) Oral every 6 hours PRN agitation

## 2023-11-15 NOTE — BH INPATIENT PSYCHIATRY PROGRESS NOTE - NSBHCHARTREVIEWVS_PSY_A_CORE FT
Vital Signs Last 24 Hrs  T(C): 36.5 (11-15-23 @ 09:25), Max: 36.6 (11-14-23 @ 17:42)  T(F): 97.7 (11-15-23 @ 09:25), Max: 97.8 (11-14-23 @ 17:42)  HR: 65 (11-15-23 @ 09:25) (65 - 70)  BP: 112/75 (11-15-23 @ 09:25) (112/75 - 129/88)  BP(mean): --  RR: 18 (11-15-23 @ 09:25) (18 - 18)  SpO2: 96% (11-15-23 @ 09:25) (96% - 100%)     Vital Signs Last 24 Hrs  T(C): 36.6 (11-15-23 @ 17:17), Max: 36.6 (11-15-23 @ 17:17)  T(F): 97.8 (11-15-23 @ 17:17), Max: 97.8 (11-15-23 @ 17:17)  HR: 61 (11-15-23 @ 17:17) (61 - 65)  BP: 125/87 (11-15-23 @ 17:17) (112/75 - 125/87)  BP(mean): --  RR: 18 (11-15-23 @ 17:17) (18 - 18)  SpO2: 98% (11-15-23 @ 17:17) (96% - 98%)

## 2023-11-16 PROCEDURE — 99232 SBSQ HOSP IP/OBS MODERATE 35: CPT

## 2023-11-16 RX ORDER — FLUVOXAMINE MALEATE 25 MG/1
1 TABLET ORAL
Qty: 0 | Refills: 0 | DISCHARGE
Start: 2023-11-16

## 2023-11-16 RX ORDER — FLUVOXAMINE MALEATE 25 MG/1
1 TABLET ORAL
Qty: 30 | Refills: 0
Start: 2023-11-16 | End: 2023-12-15

## 2023-11-16 RX ORDER — HYDROXYZINE HCL 10 MG
1 TABLET ORAL
Qty: 0 | Refills: 0 | DISCHARGE
Start: 2023-11-16

## 2023-11-16 RX ORDER — LURASIDONE HYDROCHLORIDE 40 MG/1
1 TABLET ORAL
Qty: 30 | Refills: 0
Start: 2023-11-16 | End: 2023-12-15

## 2023-11-16 RX ORDER — HYDROXYZINE HCL 10 MG
1 TABLET ORAL
Qty: 45 | Refills: 0
Start: 2023-11-16 | End: 2023-11-30

## 2023-11-16 RX ORDER — LURASIDONE HYDROCHLORIDE 40 MG/1
1 TABLET ORAL
Qty: 0 | Refills: 0 | DISCHARGE
Start: 2023-11-16

## 2023-11-16 RX ADMIN — LURASIDONE HYDROCHLORIDE 60 MILLIGRAM(S): 40 TABLET ORAL at 22:23

## 2023-11-16 RX ADMIN — FLUVOXAMINE MALEATE 50 MILLIGRAM(S): 25 TABLET ORAL at 22:23

## 2023-11-16 RX ADMIN — Medication 650 MILLIGRAM(S): at 17:35

## 2023-11-16 RX ADMIN — Medication 650 MILLIGRAM(S): at 17:03
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
## 2023-11-16 NOTE — BH PSYCHOLOGY - CLINICIAN PSYCHOTHERAPY NOTE - NSBHPSYCHOLPARTICIPCOMMENT_PSY_A_CORE FT
MsGrayson Ladi and the writer practiced DBT distress tolerance skills, which included identifying the pros and cons of succumbing to urges to drink or use cannabis, as well as belly breathing and muscle relaxation techniques. She found the discussion of pros and cons especially helpful, as it reinforced her commitment to sobriety and the importance of honesty in her relationships.

## 2023-11-16 NOTE — BH TREATMENT PLAN - NSTXDEPRESINTERRN_PSY_ALL_CORE
Encourage patient to adhere with prescribed medications and treatment. Encourage patient to attend groups and verbalize feelings and concerns. Help patient identify coping strategies that have worked in the past and support the use of these strategies.

## 2023-11-16 NOTE — BH INPATIENT PSYCHIATRY DISCHARGE NOTE - NSBHMETABOLIC_PSY_ALL_CORE_FT
BMI: BMI (kg/m2): 20.4 (11-08-23 @ 05:45)  HbA1c: A1C with Estimated Average Glucose Result: 5.1 % (11-09-23 @ 05:30)    Glucose:   BP: 119/80 (11-16-23 @ 17:06) (111/71 - 129/88)  Lipid Panel: Date/Time: 11-09-23 @ 05:30  Cholesterol, Serum: 156  Direct LDL: -- LDL Cholesterol Calculated: 100 mg/dL (11.09.23 @ 05:30)    HDL Cholesterol, Serum: 45  Total Cholesterol/HDL Ration Measurement: --  Triglycerides, Serum: 54

## 2023-11-16 NOTE — BH INPATIENT PSYCHIATRY DISCHARGE NOTE - OTHER PAST PSYCHIATRIC HISTORY (INCLUDE DETAILS REGARDING ONSET, COURSE OF ILLNESS, INPATIENT/OUTPATIENT TREATMENT)
Previous Dx: Bipolar PTSD     Previous Med Trials: reports many previous trials unk names     Previous IP treatment: 4x, last 6 years ago in [place]     Previous SA: denies     Self harming: burning with cigarettes (3 weeks ago last)     Current MH treatment: psychiatrist but unk name ([employer]); Herminia is therpaist seen weekly     Violence/Legal: denies

## 2023-11-16 NOTE — BH INPATIENT PSYCHIATRY PROGRESS NOTE - NSBHFUPINTERVALHXFT_PSY_A_CORE
info here! Ms. Bledsoe noted that today she is feeling anxious but "she knows that I'm ready to go". Tomorrow she is being discharged to a residential program in [place], and she noted that she is sad to be leaving her friends and miss some of the holidays, but she knows that this will be good for her. She noted that she had been admitted for suicidal ideation, but she is not feeling those thoughts now. She also mentioned that her intrusive thoughts were improving although she still is having some anxiety and panic attacks. She denied having any hallucinations, voices, or seeing things that other people couldn't see. She stated that she had been sleeping well and that her appetite was somewhat suppressed. She mentioned that her OCD medication was working well but that she felt like she had been "really knocked out" by the anti-anxiety medication she was given the day prior. She noted that she had a history of abusing anti-anxiety medications in high school and said that she wanted to be careful in terms of what anti-anxiety medications she took while in the hospital.

## 2023-11-16 NOTE — BH INPATIENT PSYCHIATRY DISCHARGE NOTE - NSDCMRMEDTOKEN_GEN_ALL_CORE_FT
fluvoxaMINE 50 mg oral tablet: 1 tab(s) orally once a day (at bedtime)  hydrOXYzine hydrochloride 25 mg oral tablet: 1 tab(s) orally every 8 hours as needed for Anxiety  lurasidone 60 mg oral tablet: 1 tab(s) orally once a day (at bedtime)   fluvoxaMINE 50 mg oral tablet: 1 tab(s) orally once a day (at bedtime)  fluvoxaMINE 50 mg oral tablet: 1 tab(s) orally once a day (at bedtime)  hydrOXYzine hydrochloride 25 mg oral tablet: 1 tab(s) orally every 8 hours as needed for Anxiety  lurasidone 60 mg oral tablet: 1 tab(s) orally once a day (at bedtime)  lurasidone 60 mg oral tablet: 1 tab(s) orally once a day (at bedtime)

## 2023-11-16 NOTE — BH INPATIENT PSYCHIATRY DISCHARGE NOTE - NSBHMSEEYE_PSY_A_CORE
Intubation    Date/Time: 9/17/2022 4:35 AM  Performed by: Frederick Wong CRNA  Authorized by: Dante Sanchez MD     Intubation:     Induction:  Rapid sequence induction    Intubated:  Postinduction    Mask Ventilation:  N/a    Attempts:  1    Attempted By:  CRNA    Method of Intubation:  Video laryngoscopy    Blade:  Caba 3    Laryngeal View Grade: Grade I - full view of cords      Difficult Airway Encountered?: No      Complications:  None    Airway Device:  Oral endotracheal tube    Airway Device Size:  8.0    Style/Cuff Inflation:  Cuffed    Tube secured:  23    Secured at:  The lips    Placement Verified By:  Capnometry    Complicating Factors:  None    Findings Post-Intubation:  BS equal bilateral    
Good

## 2023-11-16 NOTE — BH INPATIENT PSYCHIATRY PROGRESS NOTE - NSBHATTESTTYPEVISIT_PSY_A_CORE
Attending Only
Attending with Resident/Fellow/Student
Attending Only
Attending with Resident/Fellow/Student
Attending with Resident/Fellow/Student
Attending Only

## 2023-11-16 NOTE — BH INPATIENT PSYCHIATRY PROGRESS NOTE - NSBHCHARTREVIEWVS_PSY_A_CORE FT
Vital Signs Last 24 Hrs  T(C): 36.7 (11-16-23 @ 09:05), Max: 36.7 (11-16-23 @ 09:05)  T(F): 98.1 (11-16-23 @ 09:05), Max: 98.1 (11-16-23 @ 09:05)  HR: 67 (11-16-23 @ 09:05) (61 - 67)  BP: 118/80 (11-16-23 @ 09:05) (118/80 - 125/87)  BP(mean): --  RR: 18 (11-15-23 @ 17:17) (18 - 18)  SpO2: 97% (11-16-23 @ 09:05) (97% - 98%)     Vital Signs Last 24 Hrs  T(C): 36.4 (11-16-23 @ 17:06), Max: 36.7 (11-16-23 @ 09:05)  T(F): 97.6 (11-16-23 @ 17:06), Max: 98.1 (11-16-23 @ 09:05)  HR: 73 (11-16-23 @ 17:06) (67 - 73)  BP: 119/80 (11-16-23 @ 17:06) (118/80 - 119/80)  BP(mean): --  RR: 18 (11-16-23 @ 17:06) (18 - 18)  SpO2: 97% (11-16-23 @ 17:06) (97% - 97%)
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
## 2023-11-16 NOTE — BH DISCHARGE NOTE NURSING/SOCIAL WORK/PSYCH REHAB - NSDCPRGOAL_PSY_ALL_CORE
Pt has attended groups regularly over this hopitalization... Pt has attended groups regularly over this hospitalization. Pt continues to display low range of affect but often appears brighter when socializing with peers in the milieu. Pt endorses difficulty with managing interpersonal conflict and uses DBT skills with some success.  Pt demonstrates insight around her mental illness and appears motivated for outpatient treatment. Pt endorses acceptance of and readiness for her next residential treatment program, although reports missing her close relationships and pets. Pt completed a safety plan and received a copy to take with her.

## 2023-11-16 NOTE — BH INPATIENT PSYCHIATRY DISCHARGE NOTE - NSDCPROCEDURESFT_PSY_ALL_CORE
COVID-19 PCR: Negative (07 Nov 2023 23:22)  Thyroid Stimulating Hormone, Serum: 0.846 uIU/mL (11.07.23 @ 23:21)

## 2023-11-16 NOTE — BH DISCHARGE NOTE NURSING/SOCIAL WORK/PSYCH REHAB - NSDCADDINFO1FT_PSY_ALL_CORE
You are scheduled for a 6PM admission on 11/17/23 at [facility].

## 2023-11-16 NOTE — BH INPATIENT PSYCHIATRY DISCHARGE NOTE - HOSPITAL COURSE
---xxx  Current medications acetaminophen     Tablet .. 650 milliGRAM(s) Oral every 6 hours PRN  fluvoxaMINE 50 milliGRAM(s) Oral at bedtime  for anxiety obsessions; one month given  hydrOXYzine hydrochloride 25 milliGRAM(s) Oral every 6 hours PRN coverted to every 8 hours two weeks given for anxiety   lurasidone 60 milliGRAM(s) Oral at bedtime for mood stability ; one month given   ondansetron    Tablet 4 milliGRAM(s) Oral every 8 hours PRN not gien  ondansetron Injectable 8 milliGRAM(s) IntraMuscular every 8 hours PRN not given   QUEtiapine 50 milliGRAM(s) Oral every 6 hours PRN not given    Take until changed or discontinued by a qualified health professioinal.       She steadily improved on the unit and got a lot out of individual, group, and milieu therapy. Made bonds with peers. Became less somatically preoccupied as the hospitalization continues. PLan for pt to continue in residential treatment.     ---xxx  Current medications acetaminophen     Tablet .. 650 milliGRAM(s) Oral every 6 hours PRN  fluvoxaMINE 50 milliGRAM(s) Oral at bedtime  for anxiety obsessions; one month given  hydrOXYzine hydrochloride 25 milliGRAM(s) Oral every 6 hours PRN coverted to every 8 hours two weeks given for anxiety   lurasidone 60 milliGRAM(s) Oral at bedtime for mood stability ; one month given   ondansetron    Tablet 4 milliGRAM(s) Oral every 8 hours PRN not gien  ondansetron Injectable 8 milliGRAM(s) IntraMuscular every 8 hours PRN not given   QUEtiapine 50 milliGRAM(s) Oral every 6 hours PRN not given    Take until changed or discontinued by a qualified health professioinal.
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
## 2023-11-16 NOTE — BH INPATIENT PSYCHIATRY PROGRESS NOTE - NSBHMETABOLIC_PSY_ALL_CORE_FT
BMI: BMI (kg/m2): 20.4 (11-08-23 @ 05:45)  HbA1c: A1C with Estimated Average Glucose Result: 5.1 % (11-09-23 @ 05:30)    Glucose:   BP: 118/80 (11-16-23 @ 09:05) (111/71 - 129/88)  Lipid Panel: Date/Time: 11-09-23 @ 05:30  Cholesterol, Serum: 156  Direct LDL: --  HDL Cholesterol, Serum: 45  Total Cholesterol/HDL Ration Measurement: --  Triglycerides, Serum: 54   BMI: BMI (kg/m2): 20.4 (11-08-23 @ 05:45)  HbA1c: A1C with Estimated Average Glucose Result: 5.1 % (11-09-23 @ 05:30)    Glucose:   BP: 119/80 (11-16-23 @ 17:06) (111/71 - 129/88)  Lipid Panel: Date/Time: 11-09-23 @ 05:30  Cholesterol, Serum: 156  Direct LDL: --  HDL Cholesterol, Serum: 45  Total Cholesterol/HDL Ration Measurement: --  Triglycerides, Serum: 54

## 2023-11-16 NOTE — BH DISCHARGE NOTE NURSING/SOCIAL WORK/PSYCH REHAB - NSDCOTHERTYPOFSERV_GEN_ALL_CORE
[facility] is your connection to free, confidential mental health support. Speak to a counselor via phone, text, or chat and get access to mental health and substance use services, in more than 200 languages, 24/7/365. At any hour of any day, in almost any language, from phone, tablet or computer, [facility] is your connection to get the help you need.

## 2023-11-16 NOTE — BH INPATIENT PSYCHIATRY DISCHARGE NOTE - DESCRIPTION
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

## 2023-11-16 NOTE — BH INPATIENT PSYCHIATRY DISCHARGE NOTE - REASON FOR ADMISSION
As per admission note: "presenting to the ED with worsening suicidal ideation , intrusive thoughts of self harm and death, intrusive visions (hallucintaions?) of death/suicide with reported history  of bipolar 1 disorder.

## 2023-11-16 NOTE — BH PSYCHOLOGY - CLINICIAN PSYCHOTHERAPY NOTE - NSBHPSYCHOLSERV_PSY_A_CORE
Interval History: See above.    Review of Systems   Constitutional: Negative for chills and fever.   Gastrointestinal: Negative for abdominal pain and vomiting.     Objective:     Vital Signs (Most Recent):  Temp: 98.7 °F (37.1 °C) (09/04/18 0730)  Pulse: 81 (09/04/18 0900)  Resp: 16 (09/04/18 0900)  BP: 128/81 (09/04/18 0900)  SpO2: 96 % (09/04/18 0900) Vital Signs (24h Range):  Temp:  [97.5 °F (36.4 °C)-99.2 °F (37.3 °C)] 98.7 °F (37.1 °C)  Pulse:  [] 81  Resp:  [10-22] 16  SpO2:  [94 %-99 %] 96 %  BP: (125-177)/() 128/81     Weight: 118.8 kg (261 lb 14.5 oz)  Body mass index is 34.55 kg/m².    Intake/Output Summary (Last 24 hours) at 9/4/2018 1012  Last data filed at 9/4/2018 0800  Gross per 24 hour   Intake 990.26 ml   Output 1550 ml   Net -559.74 ml      Physical Exam   Constitutional: He is oriented to person, place, and time. He appears well-developed. No distress.   Neurological: He is alert and oriented to person, place, and time.   Psychiatric: He has a normal mood and affect.   Nursing note and vitals reviewed.      Significant Labs: All pertinent labs within the past 24 hours have been reviewed.   Blood Sugars (AccuCheck):  Recent Labs      09/02/18   1648  09/02/18   2158  09/03/18   0350  09/03/18   0733  09/03/18   1215  09/03/18   1622  09/03/18   2157  09/04/18   0643   POCTGLUCOSE  257*  281*  271*  372*  343*  300*  195*  260*       Significant Imaging: I have reviewed all pertinent imaging results/findings within the past 24 hours.  
Individual psychotherapy
Individual psychotherapy

## 2023-11-16 NOTE — BH DISCHARGE NOTE NURSING/SOCIAL WORK/PSYCH REHAB - PATIENT PORTAL LINK FT
You can access the FollowMyHealth Patient Portal offered by [facility] by registering at the following website: [email or web address]. By joining [facility]’s FollowMyHealth portal, you will also be able to view your health information using other applications (apps) compatible with our system.

## 2023-11-16 NOTE — BH TREATMENT PLAN - NSTXPLANTHERAPYSESSIONSFT_PSY_ALL_CORE
11-13-23  Type of therapy: Creative arts therapy  Type of session: Group  Level of patient participation: Attentive  Duration of participation: 30 minutes  Therapy conducted by: Psych rehab  Therapy Summary: Pt. continues to attend groups of all modalities, pt. demonstrates a brighter and full range of affect; pt. endorses feeling less depressed  and anxious; pt. is more attentive to therapy directives,    11-16-23  Type of therapy: Creative arts therapy  Type of session: Group  Level of patient participation: Participates, Quiet  Duration of participation: 45 minutes  Therapy conducted by: Psych rehab  Therapy Summary: Pt joined this group mural art therapy group. As is her frequent presentation, Pt kept mostly to herself but remained an active observer and engaged when initiated by others. Pt was drawn to the leaves, which were the media used for a group mural, although Pt selected a leaf to keep with her but declined to work on the mural. Pt noted she enjoyed watching others but keeping her distance. Pt remains quite constricted and superficially pleasant in groups, although seems less constricted in social interactions in the milieu.

## 2023-11-16 NOTE — BH DISCHARGE NOTE NURSING/SOCIAL WORK/PSYCH REHAB - NSCDUDCCRISIS_PSY_A_CORE
Last refill 2/8/22 #30   Please advise on refill   [facility]  1 [phone]) [facility] ([phone][facility]  Text "WELL" to [phone]  Website: www.[employer]/.National Suicide Prevention Lifeline 2 (899) 155-2598/.  [facility]  1 (283) LIFENET (154-6540)/988 Suicide and Crisis Lifeline
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
## 2023-11-16 NOTE — BH INPATIENT PSYCHIATRY PROGRESS NOTE - NSBHATTESTBILLING_PSY_A_CORE
38643-Sjgcltfwqx OBS or IP - moderate complexity OR 35-49 mins
02065-Ldhzbdnjke OBS or IP - moderate complexity OR 35-49 mins
46589-Asiaobpbzq OBS or IP - moderate complexity OR 35-49 mins
12900-Ylslumwwet - moderate complexity OR 30-39 mins
50081-Zdiedpysnq OBS or IP - moderate complexity OR 35-49 mins
42895-Kxxgtkegzo OBS or IP - moderate complexity OR 35-49 mins

## 2023-11-16 NOTE — BH PSYCHOLOGY - CLINICIAN PSYCHOTHERAPY NOTE - TOKEN PULL-DIAGNOSIS
Primary Diagnosis:  Depression with suicidal ideation [F32.A]        Problem Dx:   Mood disorder [F39]      
Primary Diagnosis:  Depression with suicidal ideation [F32.A]        Problem Dx:   Mood disorder [F39]

## 2023-11-16 NOTE — BH PSYCHOLOGY - CLINICIAN PSYCHOTHERAPY NOTE - NSBHPSYCHOLINT_PSY_A_CORE
Dialectical  Behavioral Therapy (DBT)/Stress management/Supportive therapy
Dynamic issues addressed/Supportive therapy

## 2023-11-16 NOTE — BH DISCHARGE NOTE NURSING/SOCIAL WORK/PSYCH REHAB - NSDCVIVACCINE_GEN_ALL_CORE_FT
influenza, injectable, quadrivalent, preservative free; 13-Nov-2023 13:45; Bev Mathew (RN); [employer]; [id number] (Exp. Date: 01-Jun-2025); IntraMuscular; Deltoid Left.; 0.5 milliLiter(s); VIS (VIS Published: 06-Aug-2021, VIS Presented: 13-Nov-2023);

## 2023-11-17 VITALS
RESPIRATION RATE: 18 BRPM | HEART RATE: 70 BPM | OXYGEN SATURATION: 99 % | SYSTOLIC BLOOD PRESSURE: 118 MMHG | DIASTOLIC BLOOD PRESSURE: 82 MMHG | TEMPERATURE: 98 F

## 2023-11-17 PROCEDURE — 99239 HOSP IP/OBS DSCHRG MGMT >30: CPT

## 2023-11-17 RX ORDER — FLUVOXAMINE MALEATE 25 MG/1
1 TABLET ORAL
Qty: 30 | Refills: 0
Start: 2023-11-17 | End: 2023-12-16

## 2023-11-17 RX ORDER — LURASIDONE HYDROCHLORIDE 40 MG/1
1 TABLET ORAL
Qty: 30 | Refills: 0
Start: 2023-11-17 | End: 2023-12-16

## 2023-11-17 NOTE — BH INPATIENT PSYCHIATRY PROGRESS NOTE - NSBHMSEAFFRANGE_PSY_A_CORE
Constricted
Constricted
Full/Constricted
Full/Constricted
Constricted
Full/Constricted
Constricted
Full/Constricted
Constricted
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
## 2023-11-17 NOTE — BH INPATIENT PSYCHIATRY PROGRESS NOTE - NSICDXBHPRIMARYDX_PSY_ALL_CORE
Depression with suicidal ideation   F32.A  

## 2023-11-17 NOTE — BH INPATIENT PSYCHIATRY PROGRESS NOTE - NSICDXBHTERTIARYDX_PSY_ALL_CORE
R/O TBI (traumatic brain injury)   S06.9XAA  R/O Migraine   G43.909  

## 2023-11-17 NOTE — BH INPATIENT PSYCHIATRY PROGRESS NOTE - NSICDXBHSECONDARYDX_PSY_ALL_CORE
Mood disorder   F39  
English
Mood disorder   F39  

## 2023-11-17 NOTE — BH INPATIENT PSYCHIATRY PROGRESS NOTE - NSBHCHARTREVIEWVS_PSY_A_CORE FT
Vital Signs Last 24 Hrs  T(C): 36.6 (11-17-23 @ 09:34), Max: 36.6 (11-17-23 @ 09:34)  T(F): 97.8 (11-17-23 @ 09:34), Max: 97.8 (11-17-23 @ 09:34)  HR: 70 (11-17-23 @ 09:34) (70 - 73)  BP: 118/82 (11-17-23 @ 09:34) (118/82 - 119/80)  BP(mean): --  RR: 18 (11-17-23 @ 09:34) (18 - 18)  SpO2: 99% (11-17-23 @ 09:34) (97% - 99%)

## 2023-11-17 NOTE — BH SOCIAL WORK CONFIRMATION FOLLOW UP NOTE - NSLINKEDTOLOC_PSY_ALL_CORE
[facility]    Adult Residential Treatment Program  [address], [place] [address]  [phone]

## 2023-11-17 NOTE — BH INPATIENT PSYCHIATRY PROGRESS NOTE - NSTXSUICIDGOALOTHER_PSY_ALL_CORE
Patient will stabilize mood & alleviate sx of SI to engage with discharge planning.

## 2023-11-17 NOTE — BH INPATIENT PSYCHIATRY PROGRESS NOTE - NSTXSUICIDINTERMD_PSY_ALL_CORE
Latuda for mood stability psychopharmacology 15 minutes a day 
Latuda for mood stability psychopharmacology 15 minutes a day

## 2023-11-17 NOTE — BH INPATIENT PSYCHIATRY PROGRESS NOTE - NSBHMSEPERCEPT_PSY_A_CORE
No abnormalities
No abnormalities
Auditory hallucinations/Visual hallucinations
No abnormalities
No abnormalities
Auditory hallucinations/Visual hallucinations
No abnormalities

## 2023-11-17 NOTE — BH INPATIENT PSYCHIATRY PROGRESS NOTE - NSBHCONSBHPROVCNTCTNOFT_PSY_A_CORE
Will attempt when given contact information from patient and consent given

## 2023-11-17 NOTE — BH INPATIENT PSYCHIATRY PROGRESS NOTE - NSTXDEPRESDATEEST_PSY_ALL_CORE
13-Nov-2023
08-Nov-2023
13-Nov-2023
13-Nov-2023
08-Nov-2023
08-Nov-2023
13-Nov-2023

## 2023-11-17 NOTE — BH SAFETY PLAN - WARNING SIGN 1
Pt completed a written safety plan and received a copy to take with her. An additional copy has been filed in Pt's chart on the unit.

## 2023-11-17 NOTE — BH INPATIENT PSYCHIATRY PROGRESS NOTE - NSTXSUICIDDATEEST_PSY_ALL_CORE
08-Nov-2023

## 2023-11-17 NOTE — BH INPATIENT PSYCHIATRY PROGRESS NOTE - NSBHMETABOLIC_PSY_ALL_CORE_FT
BMI: BMI (kg/m2): 20.4 (11-08-23 @ 05:45)  HbA1c: A1C with Estimated Average Glucose Result: 5.1 % (11-09-23 @ 05:30)    Glucose:   BP: 118/82 (11-17-23 @ 09:34) (112/75 - 129/88)  Lipid Panel: Date/Time: 11-09-23 @ 05:30  Cholesterol, Serum: 156  Direct LDL: --  HDL Cholesterol, Serum: 45  Total Cholesterol/HDL Ration Measurement: --  Triglycerides, Serum: 54

## 2023-11-17 NOTE — BH INPATIENT PSYCHIATRY PROGRESS NOTE - PRN MEDS
MEDICATIONS  (PRN):  acetaminophen     Tablet .. 650 milliGRAM(s) Oral every 6 hours PRN Temp greater or equal to 38C (100.4F), Moderate Pain (4 - 6)  hydrOXYzine hydrochloride 25 milliGRAM(s) Oral every 6 hours PRN Anxiety  ondansetron    Tablet 4 milliGRAM(s) Oral every 8 hours PRN Nausea and/or Vomiting  ondansetron Injectable 8 milliGRAM(s) IntraMuscular every 8 hours PRN nausea/vomiting  QUEtiapine 50 milliGRAM(s) Oral every 6 hours PRN agitation  

## 2023-11-17 NOTE — BH SOCIAL WORK CONFIRMATION FOLLOW UP NOTE - NSCOMMENTS_PSY_ALL_CORE
Caring Call & Linkage Call: Pt was discharged from [facility] on 11/17/23. As pt is being discharged to a residential mental health treatment program with limited access to her phone, this SW emailed [facility] Admissions Coordinators Mitali Cope ([email or web address]) & Falguni Ordonez ([email or web address]) on 11/17/23 asking that they notify this writer once the pt arrives safely at their residence for a 6PM admission time on 11/17/23.     [facility]xiety [facility]    Adult Residential Treatment Program  17-Nov-2023 18:00  [address] [address]  [phone]  Residential Mental Health Treatment Program   Caring Call & Linkage Call: Pt was discharged from [facility] on 11/17/23. As pt is being discharged to a residential mental health treatment program with limited access to her phone, this SW emailed [facility] Admissions Coordinators Mitali Cope ([email or web address]) & Falguni Ordonez ([email or web address]) on 11/17/23 asking that they notify this writer once the pt arrives safely at their residence for a 6PM admission time on 11/17/23. Cecily emailed back on 11/20/23 confirming that she was admitted to their facility on 11/17/23 @ 6PM.       Residential Mental Health Treatment Program  [facility]xiety [facility]    Adult Residential Treatment Program  17-Nov-2023 18:00  [address] [place] [address]  [phone]

## 2023-11-17 NOTE — BH INPATIENT PSYCHIATRY PROGRESS NOTE - NSTXDEPRESGOALOTHER_PSY_ALL_CORE
Pt. will participate in groups and milieu tx. structure of unit
Pt will participate in groups and milieu treatment structure of the unit
Pt will participate in groups and milieu treatment structure of the unit
Pt. will participate in groups and milieu tx. structure of unit
Pt will participate in groups and milieu treatment structure of the unit
Pt. will participate in groups and milieu tx. structure of unit
Pt will participate in groups and milieu treatment structure of the unit

## 2023-11-17 NOTE — BH INPATIENT PSYCHIATRY PROGRESS NOTE - NSBHMSEMOOD_PSY_A_CORE
Depressed/Anxious
Normal
Normal
Depressed
Depressed/Anxious
Normal
Depressed
Normal
Depressed/Anxious

## 2023-11-17 NOTE — BH INPATIENT PSYCHIATRY PROGRESS NOTE - NSTXSUICIDDATETRGT_PSY_ALL_CORE
15-Nov-2023
22-Nov-2023
22-Nov-2023
15-Nov-2023

## 2023-11-17 NOTE — BH INPATIENT PSYCHIATRY PROGRESS NOTE - NSBHMSEGAIT_PSY_A_CORE
Normal gait / station
Unable to assess
Normal gait / station
Normal gait / station
Unable to assess
Normal gait / station
Unable to assess

## 2023-11-17 NOTE — BH INPATIENT PSYCHIATRY PROGRESS NOTE - NSBHMSETHTCONTENT_PSY_A_CORE
Unremarkable
Suicidality
Unremarkable
Suicidality
Unremarkable
Unremarkable

## 2023-11-17 NOTE — BH INPATIENT PSYCHIATRY PROGRESS NOTE - NSTXDEPRESDATETRGT_PSY_ALL_CORE
13-Nov-2023
20-Nov-2023
13-Nov-2023
13-Nov-2023

## 2023-11-17 NOTE — BH INPATIENT PSYCHIATRY PROGRESS NOTE - NSDCCRITERIA_PSY_ALL_CORE
Decreased suicidal ideation and improved depressive symptoms.

## 2023-11-21 DIAGNOSIS — R44.0 AUDITORY HALLUCINATIONS: ICD-10-CM

## 2023-11-21 DIAGNOSIS — F43.10 POST-TRAUMATIC STRESS DISORDER, UNSPECIFIED: ICD-10-CM

## 2023-11-21 DIAGNOSIS — F32.A DEPRESSION, UNSPECIFIED: ICD-10-CM

## 2023-11-21 DIAGNOSIS — F39 UNSPECIFIED MOOD [AFFECTIVE] DISORDER: ICD-10-CM

## 2023-11-21 DIAGNOSIS — R45.851 SUICIDAL IDEATIONS: ICD-10-CM

## 2023-11-21 DIAGNOSIS — Z91.52 PERSONAL HISTORY OF NONSUICIDAL SELF-HARM: ICD-10-CM

## 2023-11-29 PROCEDURE — 80307 DRUG TEST PRSMV CHEM ANLYZR: CPT

## 2023-11-29 PROCEDURE — 87389 HIV-1 AG W/HIV-1&-2 AB AG IA: CPT

## 2023-11-29 PROCEDURE — 84443 ASSAY THYROID STIM HORMONE: CPT

## 2023-11-29 PROCEDURE — 82652 VIT D 1 25-DIHYDROXY: CPT

## 2023-11-29 PROCEDURE — 85025 COMPLETE CBC W/AUTO DIFF WBC: CPT

## 2023-11-29 PROCEDURE — 36415 COLL VENOUS BLD VENIPUNCTURE: CPT

## 2023-11-29 PROCEDURE — 82607 VITAMIN B-12: CPT

## 2023-11-29 PROCEDURE — 86780 TREPONEMA PALLIDUM: CPT

## 2023-11-29 PROCEDURE — 83036 HEMOGLOBIN GLYCOSYLATED A1C: CPT

## 2023-11-29 PROCEDURE — 92526 ORAL FUNCTION THERAPY: CPT

## 2023-11-29 PROCEDURE — 81003 URINALYSIS AUTO W/O SCOPE: CPT

## 2023-11-29 PROCEDURE — 80053 COMPREHEN METABOLIC PANEL: CPT

## 2023-11-29 PROCEDURE — 86769 SARS-COV-2 COVID-19 ANTIBODY: CPT

## 2023-11-29 PROCEDURE — 84702 CHORIONIC GONADOTROPIN TEST: CPT

## 2023-11-29 PROCEDURE — 99285 EMERGENCY DEPT VISIT HI MDM: CPT

## 2023-11-29 PROCEDURE — 82746 ASSAY OF FOLIC ACID SERUM: CPT

## 2023-11-29 PROCEDURE — 80061 LIPID PANEL: CPT

## 2023-11-29 PROCEDURE — 90686 IIV4 VACC NO PRSV 0.5 ML IM: CPT

## 2023-11-29 PROCEDURE — 87635 SARS-COV-2 COVID-19 AMP PRB: CPT

## 2023-11-29 PROCEDURE — 93005 ELECTROCARDIOGRAM TRACING: CPT

## 2024-01-24 NOTE — BH PSYCHOLOGY - CLINICIAN PSYCHOTHERAPY NOTE - NSTXSUICIDGOALOTHER_PSY_ALL_CORE
Patient will stabilize mood & alleviate sx of SI to engage with discharge planning.
Patient will stabilize mood & alleviate sx of SI to engage with discharge planning.
Abi